# Patient Record
Sex: FEMALE | Race: BLACK OR AFRICAN AMERICAN | NOT HISPANIC OR LATINO | URBAN - METROPOLITAN AREA
[De-identification: names, ages, dates, MRNs, and addresses within clinical notes are randomized per-mention and may not be internally consistent; named-entity substitution may affect disease eponyms.]

---

## 2022-09-13 ENCOUNTER — OCCMED (OUTPATIENT)
Dept: URGENT CARE | Facility: CLINIC | Age: 42
End: 2022-09-13

## 2022-09-13 ENCOUNTER — APPOINTMENT (OUTPATIENT)
Dept: LAB | Facility: CLINIC | Age: 42
End: 2022-09-13

## 2022-09-13 DIAGNOSIS — Z02.1 PRE-EMPLOYMENT EXAMINATION: ICD-10-CM

## 2022-09-13 DIAGNOSIS — Z02.1 PRE-EMPLOYMENT EXAMINATION: Primary | ICD-10-CM

## 2022-09-13 LAB — RUBV IGG SERPL IA-ACNC: >175 IU/ML

## 2022-09-13 PROCEDURE — 86765 RUBEOLA ANTIBODY: CPT

## 2022-09-13 PROCEDURE — 36415 COLL VENOUS BLD VENIPUNCTURE: CPT

## 2022-09-13 PROCEDURE — 86762 RUBELLA ANTIBODY: CPT

## 2022-09-13 PROCEDURE — 86735 MUMPS ANTIBODY: CPT

## 2022-09-13 PROCEDURE — 86480 TB TEST CELL IMMUN MEASURE: CPT

## 2022-09-13 PROCEDURE — 86787 VARICELLA-ZOSTER ANTIBODY: CPT

## 2022-09-14 LAB
MEV IGG SER QL IA: NORMAL
MUV IGG SER QL IA: ABNORMAL
VZV IGG SER QL IA: NORMAL

## 2022-09-15 LAB
GAMMA INTERFERON BACKGROUND BLD IA-ACNC: 0.02 IU/ML
M TB IFN-G BLD-IMP: NEGATIVE
M TB IFN-G CD4+ BCKGRND COR BLD-ACNC: 0 IU/ML
M TB IFN-G CD4+ BCKGRND COR BLD-ACNC: 0 IU/ML
MITOGEN IGNF BCKGRD COR BLD-ACNC: >10 IU/ML

## 2023-09-29 ENCOUNTER — APPOINTMENT (EMERGENCY)
Dept: RADIOLOGY | Facility: HOSPITAL | Age: 43
End: 2023-09-29
Payer: OTHER MISCELLANEOUS

## 2023-09-29 ENCOUNTER — HOSPITAL ENCOUNTER (EMERGENCY)
Facility: HOSPITAL | Age: 43
Discharge: HOME | End: 2023-09-29
Attending: EMERGENCY MEDICINE
Payer: OTHER MISCELLANEOUS

## 2023-09-29 VITALS
SYSTOLIC BLOOD PRESSURE: 123 MMHG | HEART RATE: 85 BPM | TEMPERATURE: 98.7 F | DIASTOLIC BLOOD PRESSURE: 63 MMHG | RESPIRATION RATE: 18 BRPM | OXYGEN SATURATION: 98 %

## 2023-09-29 DIAGNOSIS — M79.601 RIGHT ARM PAIN: Primary | ICD-10-CM

## 2023-09-29 PROCEDURE — 99284 EMERGENCY DEPT VISIT MOD MDM: CPT | Performed by: EMERGENCY MEDICINE

## 2023-09-29 PROCEDURE — 73090 X-RAY EXAM OF FOREARM: CPT

## 2023-09-29 PROCEDURE — 99283 EMERGENCY DEPT VISIT LOW MDM: CPT

## 2023-09-29 RX ORDER — IBUPROFEN 600 MG/1
600 TABLET ORAL ONCE
Status: COMPLETED | OUTPATIENT
Start: 2023-09-29 | End: 2023-09-29

## 2023-09-29 RX ORDER — LIDOCAINE 50 MG/G
1 PATCH TOPICAL ONCE
Status: DISCONTINUED | OUTPATIENT
Start: 2023-09-29 | End: 2023-09-30 | Stop reason: HOSPADM

## 2023-09-29 RX ADMIN — LIDOCAINE 1 PATCH: 50 PATCH TOPICAL at 21:20

## 2023-09-29 RX ADMIN — IBUPROFEN 600 MG: 600 TABLET, FILM COATED ORAL at 21:20

## 2023-09-29 NOTE — Clinical Note
Kal Melendez was seen and treated in our emergency department on 9/29/2023. Diagnosis:     Armando Chowdhury  is off the rest of the shift today. She may return on this date: 10/01/2023         If you have any questions or concerns, please don't hesitate to call.       Wilda Vasquez MD    ______________________________           _______________          _______________  Hospital Representative                              Date                                Time

## 2023-09-29 NOTE — Clinical Note
Tania Hutton was seen and treated in our emergency department on 9/29/2023. Diagnosis:     Dmitriy Luis  is off the rest of the shift today. She may return on this date: 09/29/2023         If you have any questions or concerns, please don't hesitate to call.       Ronan Fraser MD    ______________________________           _______________          _______________  Hospital Representative                              Date                                Time

## 2023-09-29 NOTE — Clinical Note
Joel Griffin was seen and treated in our emergency department on 9/29/2023. Diagnosis:     Manav Ignacio  is off the rest of the shift today. She may return on this date: 09/29/2023         If you have any questions or concerns, please don't hesitate to call.       Froilan Ruggiero MD    ______________________________           _______________          _______________  Hospital Representative                              Date                                Time

## 2023-09-29 NOTE — Clinical Note
Myra Tanner was seen and treated in our emergency department on 9/29/2023. Diagnosis:     Isaac Bowles  is off the rest of the shift today. She may return on this date: 10/01/2023         If you have any questions or concerns, please don't hesitate to call.       Yash Puentes MD    ______________________________           _______________          _______________  Hospital Representative                              Date                                Time

## 2023-09-29 NOTE — Clinical Note
Sierra Carrillo was seen and treated in our emergency department on 9/29/2023. Diagnosis:     Shena Nicholas  is off the rest of the shift today. She may return on this date: 09/29/2023         If you have any questions or concerns, please don't hesitate to call.       nE Estes MD    ______________________________           _______________          _______________  Hospital Representative                              Date                                Time

## 2023-09-30 NOTE — DISCHARGE INSTRUCTIONS
Please follow-up with occupational health as soon as possible     You can take tylenol and/or motrin for pain as needed, follow the dosing instructions on the bottles. Apply ICE to the affected area as needed, follow the instructions provided. Return to the emergency department if you experience severe uncontrolled pain, the arm swells significantly, you lose sensation in your fingers, or any other concerning symptoms.
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
## 2023-10-03 ENCOUNTER — APPOINTMENT (OUTPATIENT)
Dept: URGENT CARE | Facility: CLINIC | Age: 43
End: 2023-10-03
Payer: OTHER MISCELLANEOUS

## 2023-10-03 PROCEDURE — 99213 OFFICE O/P EST LOW 20 MIN: CPT | Performed by: PHYSICIAN ASSISTANT
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
## 2023-10-10 ENCOUNTER — APPOINTMENT (OUTPATIENT)
Dept: URGENT CARE | Facility: CLINIC | Age: 43
End: 2023-10-10
Payer: OTHER MISCELLANEOUS

## 2023-10-10 PROCEDURE — 99213 OFFICE O/P EST LOW 20 MIN: CPT

## 2023-11-14 ENCOUNTER — APPOINTMENT (OUTPATIENT)
Dept: URGENT CARE | Facility: CLINIC | Age: 43
End: 2023-11-14
Payer: OTHER MISCELLANEOUS

## 2023-11-14 PROCEDURE — 99213 OFFICE O/P EST LOW 20 MIN: CPT

## 2023-11-27 ENCOUNTER — EVALUATION (OUTPATIENT)
Dept: PHYSICAL THERAPY | Facility: CLINIC | Age: 43
End: 2023-11-27
Payer: OTHER MISCELLANEOUS

## 2023-11-27 DIAGNOSIS — M25.511 ACUTE PAIN OF RIGHT SHOULDER: Primary | ICD-10-CM

## 2023-11-27 PROCEDURE — 97162 PT EVAL MOD COMPLEX 30 MIN: CPT

## 2023-11-27 PROCEDURE — 97110 THERAPEUTIC EXERCISES: CPT
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
## 2023-12-12 ENCOUNTER — HOSPITAL ENCOUNTER (OUTPATIENT)
Dept: MRI IMAGING | Facility: HOSPITAL | Age: 43
Discharge: HOME | End: 2023-12-12
Attending: FAMILY MEDICINE
Payer: OTHER MISCELLANEOUS

## 2023-12-12 ENCOUNTER — HOSPITAL ENCOUNTER (OUTPATIENT)
Dept: RADIOLOGY | Facility: HOSPITAL | Age: 43
Discharge: HOME | End: 2023-12-12
Attending: FAMILY MEDICINE
Payer: OTHER MISCELLANEOUS

## 2023-12-12 ENCOUNTER — APPOINTMENT (OUTPATIENT)
Dept: PHYSICAL THERAPY | Facility: CLINIC | Age: 43
End: 2023-12-12
Payer: OTHER MISCELLANEOUS

## 2023-12-12 DIAGNOSIS — M25.511 PAIN IN RIGHT SHOULDER: ICD-10-CM

## 2023-12-12 DIAGNOSIS — S50.11XA CONTUSION OF RIGHT FOREARM, INITIAL ENCOUNTER: ICD-10-CM

## 2023-12-12 PROCEDURE — G1004 CDSM NDSC: HCPCS

## 2023-12-12 PROCEDURE — 73222 MRI JOINT UPR EXTREM W/DYE: CPT

## 2023-12-12 PROCEDURE — 23350 INJECTION FOR SHOULDER X-RAY: CPT

## 2023-12-12 PROCEDURE — A9585 GADOBUTROL INJECTION: HCPCS | Performed by: FAMILY MEDICINE

## 2023-12-12 PROCEDURE — 77002 NEEDLE LOCALIZATION BY XRAY: CPT

## 2023-12-12 RX ORDER — GADOBUTROL 604.72 MG/ML
2 INJECTION INTRAVENOUS
Status: COMPLETED | OUTPATIENT
Start: 2023-12-12 | End: 2023-12-12

## 2023-12-12 RX ORDER — LIDOCAINE HYDROCHLORIDE 10 MG/ML
30 INJECTION, SOLUTION EPIDURAL; INFILTRATION; INTRACAUDAL; PERINEURAL
Status: COMPLETED | OUTPATIENT
Start: 2023-12-12 | End: 2023-12-12

## 2023-12-12 RX ADMIN — GADOBUTROL 2 ML: 604.72 INJECTION INTRAVENOUS at 11:52

## 2023-12-12 RX ADMIN — LIDOCAINE HYDROCHLORIDE 4 ML: 10 INJECTION, SOLUTION EPIDURAL; INFILTRATION; INTRACAUDAL; PERINEURAL at 11:53

## 2023-12-12 RX ADMIN — IOHEXOL 1 ML: 300 INJECTION, SOLUTION INTRAVENOUS at 11:52

## 2023-12-13 ENCOUNTER — APPOINTMENT (OUTPATIENT)
Dept: PHYSICAL THERAPY | Facility: CLINIC | Age: 43
End: 2023-12-13
Payer: OTHER MISCELLANEOUS

## 2023-12-13 ENCOUNTER — OFFICE VISIT (OUTPATIENT)
Dept: PHYSICAL THERAPY | Facility: CLINIC | Age: 43
End: 2023-12-13
Payer: OTHER MISCELLANEOUS

## 2023-12-13 DIAGNOSIS — M25.511 ACUTE PAIN OF RIGHT SHOULDER: Primary | ICD-10-CM

## 2023-12-13 PROCEDURE — 97535 SELF CARE MNGMENT TRAINING: CPT | Performed by: PHYSICAL THERAPIST

## 2023-12-13 PROCEDURE — 97140 MANUAL THERAPY 1/> REGIONS: CPT | Performed by: PHYSICAL THERAPIST

## 2023-12-13 NOTE — PROGRESS NOTES
Daily Note     Today's date: 2023  Patient name: Virginia Lucero  : 1980  MRN: [id number]  Referring provider: Gt Staley DO  Dx:   Encounter Diagnosis     ICD-10-CM    1. Acute pain of right shoulder  M25.511                      Subjective: Fabiola Vivas explains that her pain is about a 9/10 this evening, she rests at about a 6/10 on average during the day. 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 Her shoulder is a little more irritated from the MRI with contrast yesterday. She has growing concern that she has a tear in the shoulder and if hoping for some relief from PT. Objective: See treatment diary below      Assessment: Tolerated treatment well. Patient demonstrated fatigue post treatment and would benefit from continued PT. Heavy restrictions and tenderness noted throughout the right shoulder and forearm. Light soft tissue mobilizations and moist heat provided this session, IASTM not tolerated well from Pt with increased pain. Will follow up on MRI results and progress as able. Encouraged her to focus on activity modification, pain control and using moist heat at home. Plan: Continue per plan of care. Precautions: None    Access Code: [email or web address]  URL: [email or web address]  Date: 2023  Prepared by: Hoda Bloom    Exercises  - Seated Shoulder Flexion Towel Slide at Table Top  - 1 x daily - 7 x weekly - 1 sets - 10 reps - 5 s hold  - Seated Shoulder Scaption Slide at Table Top with Forearm in Neutral  - 1 x daily - 7 x weekly - 1 sets - 10 reps - 5 s hold  - Seated Gentle Upper Trapezius Stretch  - 1 x daily - 7 x weekly - 1 sets - 10 reps - 10 s hold  - Standing Backward Shoulder Rolls  - 1 x daily - 7 x weekly - 1 sets - 10 reps  - Standing shoulder flexion wall slides  - 1 x daily - 7 x weekly - 1 sets - 10 reps - 5 s hold  - Standing Wrist Flexion Stretch  - 1 x daily - 7 x weekly - 1 sets - 10 reps - 5 s hold    Manuals   PROM Right shoulder  RN           Shoulder mobs, inf/post Grade 2 - 3 right shoulder  RN           IASTM right forearm extensor muscles  RN                        Neuro Re-Ed             Ball roll on wall             T band scapular slides up wall             T band wall clock             Wall push ups             Scapular retraction                                       Ther Ex             Pulleys flex/scap             Supine cane shldr flex             Table slides flex/scap/ER             Wall slides flex/scaption             Serratus punch supine             T band rows/ext             T band ER/IR             Doorway stretch                                                                 Ther Activity             UBE                          Gait Training                                       Modalities             CP

## 2023-12-15 ENCOUNTER — OFFICE VISIT (OUTPATIENT)
Dept: PHYSICAL THERAPY | Facility: CLINIC | Age: 43
End: 2023-12-15
Payer: OTHER MISCELLANEOUS

## 2023-12-15 DIAGNOSIS — M25.511 ACUTE PAIN OF RIGHT SHOULDER: Primary | ICD-10-CM

## 2023-12-15 PROCEDURE — 97140 MANUAL THERAPY 1/> REGIONS: CPT | Performed by: PHYSICAL THERAPIST

## 2023-12-15 PROCEDURE — 97112 NEUROMUSCULAR REEDUCATION: CPT | Performed by: PHYSICAL THERAPIST

## 2023-12-15 PROCEDURE — 97110 THERAPEUTIC EXERCISES: CPT | Performed by: PHYSICAL THERAPIST

## 2023-12-15 NOTE — PROGRESS NOTES
Daily Note     Today's date: 12/15/2023  Patient name: Yelena Olivares  : 1980  MRN: [id number]  Referring provider: Krystin Francisco DO  Dx:   Encounter Diagnosis     ICD-10-CM    1. Acute pain of right shoulder  M25.511                      Subjective: Giuliana explains that she continues to be sore, she feels like to hands on treatment is helpful and is hopeful to get things on the right track. Objective: See treatment diary below      Assessment: Tolerated treatment well. Patient demonstrated fatigue post treatment  Educated pt on condition and symptoms as well as ways to mobilize soft tissues herself with a tennis ball given her relief. Also added in suboccipital release with reduction in upper trapezius tension and changes made in distal neural symptoms in right hand. Will continue to progress pain control and reduction in soft tissue restrictions nv. Plan: Continue per plan of care.       Precautions: None    Manuals  12/13 12/15          PROM Right shoulder  RN RN          Shoulder mobs, inf/post Grade 2 - 3 right shoulder             IASTM/STM right forearm extensor muscles  RN RN          SOR   RN          Neuro Re-Ed             Chatalog on wall             T band scapular slides up wall             T band wall clock             Wall push ups             Scapular retraction   10x5''          Chin tucks   10x5'' supine                       Ther Ex             Pulleys flex/scap             Supine cane shldr flex             Table slides flex/scap/ER             Wall slides flex/scaption             Serratus punch supine             T band rows/ext             T band ER/IR             Doorway stretch             UT s'   10x5''          Self ball massage w/ towel to rhomobids/UT   HEP          Seated rhomboid s'   10x5''                       Ther Activity             UBE                          Gait Training                                       Modalities             CP                8' pre

## 2023-12-19 ENCOUNTER — APPOINTMENT (OUTPATIENT)
Dept: URGENT CARE | Facility: CLINIC | Age: 43
End: 2023-12-19
Payer: OTHER MISCELLANEOUS

## 2023-12-19 PROCEDURE — 99213 OFFICE O/P EST LOW 20 MIN: CPT

## 2023-12-27 ENCOUNTER — OFFICE VISIT (OUTPATIENT)
Dept: PHYSICAL THERAPY | Facility: CLINIC | Age: 43
End: 2023-12-27
Payer: OTHER MISCELLANEOUS

## 2023-12-27 DIAGNOSIS — M25.511 ACUTE PAIN OF RIGHT SHOULDER: Primary | ICD-10-CM

## 2023-12-27 PROCEDURE — 97110 THERAPEUTIC EXERCISES: CPT | Performed by: PHYSICAL THERAPIST

## 2023-12-27 PROCEDURE — 97140 MANUAL THERAPY 1/> REGIONS: CPT | Performed by: PHYSICAL THERAPIST

## 2023-12-27 NOTE — PROGRESS NOTES
Daily Note     Today's date: 2023  Patient name: Yoselyn Xie  : 1980  MRN: [id number]  Referring provider: Hima Grove DO  Dx:   Encounter Diagnosis     ICD-10-CM    1. Acute pain of right shoulder  M25.511                      Subjective: Yoselyn explains that she woke up with a lot of pain in her upper trap and shoulder blade area on the right, she is really wanting to loosen it up with therapy today.       Objective: See treatment diary below      Assessment: Tolerated treatment well. Patient demonstrated fatigue post treatment and exhibited good technique with therapeutic exercises. Much improved pain levels and flexibility upon completion of session. She responded well to trigger point release focused at right rhomboids and upper trapezius. Also added in doorway rhomboid stretches, updated HEP to reflect changes made this session.    Plan: Continue per plan of care.      Precautions: None    Manuals  12/13 12/15 12/27         PROM Right shoulder  RN RN RN         Shoulder mobs, inf/post Grade 2 - 3 right shoulder             IASTM/STM right forearm extensor muscles  RN RN          TPR/STM R rhomboids/UT    RN seated         SOR   RN RN         Neuro Re-Ed             Ball roll on wall             T band scapular slides up wall             T band wall clock             Wall push ups             Scapular retraction   10x5'' 10x5''         Chin tucks   10x5'' supine                       Ther Ex             Pulleys flex/scap             Supine cane shldr flex             Table slides flex/scap/ER             Wall slides flex/scaption             Serratus punch supine             T band rows/ext             T band ER/IR             Doorway stretch             UT s'   10x5'' 10x5''         Self ball massage w/ towel to rhomobids/UT   HEP          Seated rhomboid s'   10x5'' 10x5''         Doorway rhomboid s'    10x5''         Ther Activity             UBE                          Gait  Training                                       Modalities             CP                8' pre 8' pre seated

## 2024-01-03 ENCOUNTER — APPOINTMENT (OUTPATIENT)
Dept: PHYSICAL THERAPY | Facility: CLINIC | Age: 44
End: 2024-01-03
Payer: OTHER MISCELLANEOUS

## 2024-01-04 ENCOUNTER — OFFICE VISIT (OUTPATIENT)
Dept: PHYSICAL THERAPY | Facility: CLINIC | Age: 44
End: 2024-01-04
Payer: OTHER MISCELLANEOUS

## 2024-01-04 DIAGNOSIS — M25.511 ACUTE PAIN OF RIGHT SHOULDER: Primary | ICD-10-CM

## 2024-01-04 PROCEDURE — 97140 MANUAL THERAPY 1/> REGIONS: CPT | Performed by: PHYSICAL THERAPIST

## 2024-01-04 NOTE — PROGRESS NOTES
Daily Note     Today's date: 2024  Patient name: Yoselyn Xie  : 1980  MRN: [id number]  Referring provider: Hima Grove DO  Dx:   Encounter Diagnosis     ICD-10-CM    1. Acute pain of right shoulder  M25.511                      Subjective: Giuliana explains that her shoulder and neck feel very tight today, she is disappointed.       Objective: See treatment diary below      Assessment: Tolerated treatment well. Patient demonstrated fatigue post treatment and exhibited good technique with therapeutic exercises. Reduced tolerance to manuals this session, emphasis placed on importance of shoulder position and keeping upper trapezius calm. Focused on manuals this session, encouraged her to try shoulder flexion stretches supported.       Plan: Continue per plan of care.      Precautions: None    Manuals  12/13 12/15 12/27 1/4        PROM Right shoulder  RN RN RN RN        Shoulder mobs, inf/post Grade 2 - 3 right shoulder             IASTM/STM right forearm extensor muscles  RN RN          TPR/STM R rhomboids/UT    RN seated RN seated        SOR   RN RN RN        Neuro Re-Ed             Ball roll on wall             T band scapular slides up wall             T band wall clock             Wall push ups             Scapular retraction   10x5'' 10x5''         Chin tucks   10x5'' supine                       Ther Ex             Pulleys flex/scap             Supine cane shldr flex             Table slides flex/scap/ER             Wall slides flex/scaption             Serratus punch supine             T band rows/ext             T band ER/IR             Doorway stretch             UT s'   10x5'' 10x5'' 10x5''        Self ball massage w/ towel to rhomobids/UT   HEP          Seated rhomboid s'   10x5'' 10x5'' nv        Ball roll outs     2x10x5''        Doorway rhomboid s'    10x5'' nv        Ther Activity             UBE                          Gait Training                                       Modalities              CP             MH   8' pre 8' pre seated

## 2024-01-08 ENCOUNTER — OFFICE VISIT (OUTPATIENT)
Dept: PHYSICAL THERAPY | Facility: CLINIC | Age: 44
End: 2024-01-08
Payer: OTHER MISCELLANEOUS

## 2024-01-08 DIAGNOSIS — M25.511 ACUTE PAIN OF RIGHT SHOULDER: Primary | ICD-10-CM

## 2024-01-08 PROCEDURE — 97140 MANUAL THERAPY 1/> REGIONS: CPT | Performed by: PHYSICAL THERAPIST

## 2024-01-08 PROCEDURE — 97112 NEUROMUSCULAR REEDUCATION: CPT | Performed by: PHYSICAL THERAPIST

## 2024-01-08 NOTE — PROGRESS NOTES
Daily Note     Today's date: 2024  Patient name: Yoselyn Xie  : 1980  MRN: [id number]  Referring provider: Hima Grove DO  Dx:   Encounter Diagnosis     ICD-10-CM    1. Acute pain of right shoulder  M25.511                      Subjective: Giuliana explains that she is excited for PT as she feels better afterwards.      Objective: See treatment diary below      Assessment: Tolerated treatment well. Patient demonstrated fatigue post treatment. Significant tenderness noted to pectoralis musculature, time spent completing soft tissue mobilizations. Continued radiating pain into right forearm and hand during manuals. Continue to progress chest wall expansion and manuals to promote full return to maximal level of function.       Plan: Continue per plan of care.      Precautions: None    Manuals  12/13 12/15 12/27 1/4 1/8       PROM Right shoulder  RN RN RN RN RN       Shoulder mobs, inf/post Grade 2 - 3 right shoulder             IASTM/STM right forearm extensor muscles  RN RN          TPR/STM R rhomboids/UT    RN seated RN seated RN       SOR   RN RN RN RN       STM Pec domenico/min      RN       Neuro Re-Ed             Ball roll on wall             T band scapular slides up wall             T band wall clock             Wall push ups             Scapular retraction   10x5'' 10x5''  10x5''       Chin tucks   10x5'' supine   10x5'' supine                    Ther Ex             Pulleys flex/scap             Supine cane shldr flex             Table slides flex/scap/ER             Wall slides flex/scaption             Serratus punch supine             T band rows/ext             T band ER/IR             Doorway stretch             UT s'   10x5'' 10x5'' 10x5'' 10x5''       Self ball massage w/ towel to rhomobids/UT   HEP          Seated rhomboid s'   10x5'' 10x5'' nv        Ball roll outs     2x10x5'' 2x10x5''       Seated thoracic ext s'      10x5''       Doorway rhomboid s'    10x5'' nv        Ther Activity              UBE                          Gait Training                                       Modalities             CP             MH   8' pre 8' pre seated

## 2024-01-10 ENCOUNTER — OFFICE VISIT (OUTPATIENT)
Dept: PHYSICAL THERAPY | Facility: CLINIC | Age: 44
End: 2024-01-10
Payer: OTHER MISCELLANEOUS

## 2024-01-10 DIAGNOSIS — M25.511 ACUTE PAIN OF RIGHT SHOULDER: Primary | ICD-10-CM

## 2024-01-10 PROCEDURE — 97112 NEUROMUSCULAR REEDUCATION: CPT | Performed by: PHYSICAL THERAPIST

## 2024-01-10 PROCEDURE — 97140 MANUAL THERAPY 1/> REGIONS: CPT | Performed by: PHYSICAL THERAPIST

## 2024-01-10 NOTE — PROGRESS NOTES
Daily Note     Today's date: 1/10/2024  Patient name: Yoselyn Xie  : 1980  MRN: [id number]  Referring provider: Hima Grove DO  Dx:   Encounter Diagnosis     ICD-10-CM    1. Acute pain of right shoulder  M25.511                      Subjective: Giuliana explains that today her back shoulder area is sore today, she notes that her forearm is not so sore today.       Objective: See treatment diary below      Assessment: Tolerated treatment well. Patient demonstrated fatigue post treatment and exhibited good technique with therapeutic exercises. Giuliana explains that her right shoulder pain greatly subsides after manuals and trigger point release, particularly to upper trapezius and levator scapulae today. Education provided about self massage with S hook today for consistency at home.       Plan: Continue per plan of care.      Precautions: None    Manuals  12/13 12/15 12/27 1/4 1/8 1/10      PROM Right shoulder  RN RN RN RN RN RN      Shoulder mobs, inf/post Grade 2 - 3 right shoulder             IASTM/STM right forearm extensor muscles  RN RN          TPR/STM R rhomboids/UT    RN seated RN seated RN RN      SOR   RN RN RN RN RN      STM Pec domenico/min      RN RN      Neuro Re-Ed             Ball roll on wall             T band scapular slides up wall             T band wall clock             Wall push ups             Scapular retraction   10x5'' 10x5''  10x5'' 10x5''      Chin tucks   10x5'' supine   10x5'' supine 10x5'' supine                   Ther Ex             Pulleys flex/scap             Supine cane shldr flex             Table slides flex/scap/ER             Wall slides flex/scaption             Serratus punch supine             T band rows/ext             T band ER/IR             Doorway stretch             UT s'   10x5'' 10x5'' 10x5'' 10x5''       Self ball massage w/ towel to rhomobids/UT   HEP          Seated rhomboid s'   10x5'' 10x5'' nv        Ball roll outs     2x10x5'' 2x10x5''       Seated  thoracic ext s'      2x10x5''       Doorway pec s'      2x10x5''       Doorway rhomboid s'    10x5'' nv 2x10x5''       Ther Activity             UBE                          Gait Training                                       Modalities             CP             MH   8' pre 8' pre seated  8' pre seated

## 2024-01-15 ENCOUNTER — OFFICE VISIT (OUTPATIENT)
Dept: PHYSICAL THERAPY | Facility: CLINIC | Age: 44
End: 2024-01-15
Payer: OTHER MISCELLANEOUS

## 2024-01-15 DIAGNOSIS — M25.511 ACUTE PAIN OF RIGHT SHOULDER: Primary | ICD-10-CM

## 2024-01-15 PROCEDURE — 97140 MANUAL THERAPY 1/> REGIONS: CPT | Performed by: PHYSICAL THERAPIST

## 2024-01-15 PROCEDURE — 97112 NEUROMUSCULAR REEDUCATION: CPT | Performed by: PHYSICAL THERAPIST

## 2024-01-15 NOTE — PROGRESS NOTES
Daily Note     Today's date: 1/15/2024  Patient name: Yoselyn Xie  : 1980  MRN: [id number]  Referring provider: Hima Grove DO  Dx:   Encounter Diagnosis     ICD-10-CM    1. Acute pain of right shoulder  M25.511                      Subjective: Yoselyn explains that her shoulder feels tight, she is overall feeling okay today.       Objective: See treatment diary below      Assessment: Tolerated treatment well. Patient demonstrated fatigue post treatment and exhibited good technique with therapeutic exercises. Giuliana explains that her shoulder felt better than normal with the manuals. Added in shoulder flexion stretch on wall with cueing to avoid pain and controlling upper trapezius compensations. Updated HEP to reflect changes made this session.      Plan: Continue per plan of care.      Precautions: None    Manuals  12/13 12/15 12/27 1/4 1/8 1/10 1/15     PROM Right shoulder  RN RN RN RN RN RN RN     Shoulder mobs, inf/post Grade 2 - 3 right shoulder             IASTM/STM right forearm extensor muscles  RN RN          TPR/STM R rhomboids/UT    RN seated RN seated RN RN RN     SOR   RN RN RN RN RN RN     STM Pec domenico/min      RN RN RN     Neuro Re-Ed             Ball roll on wall             T band scapular slides up wall             T band wall clock             Wall push ups             Scapular retraction   10x5'' 10x5''  10x5'' 10x5'' 10x5''     Chin tucks   10x5'' supine   10x5'' supine 10x5'' supine 10x5'' supine     Shoulder wall slides flexion        x10     Ther Ex             Pulleys flex/scap             Supine cane shldr flex             Table slides flex/scap/ER             Wall slides flex/scaption             Serratus punch supine             T band rows/ext             T band ER/IR             Doorway stretch             UT s'   10x5'' 10x5'' 10x5'' 10x5''       Self ball massage w/ towel to rhomobids/UT   HEP          Seated rhomboid s'   10x5'' 10x5'' nv        Ball roll outs      2x10x5'' 2x10x5''  2x10x5''     Seated thoracic ext s'      2x10x5''       Doorway pec s'      2x10x5''  2x10x0''     Doorway rhomboid s'    10x5'' nv 2x10x5''  2x10x5''     Ther Activity             UBE                          Gait Training                                       Modalities             CP                8' pre 8' pre seated  8' pre seated  8' seated pre

## 2024-01-16 ENCOUNTER — APPOINTMENT (OUTPATIENT)
Dept: URGENT CARE | Facility: CLINIC | Age: 44
End: 2024-01-16
Payer: OTHER MISCELLANEOUS

## 2024-01-16 PROCEDURE — 99213 OFFICE O/P EST LOW 20 MIN: CPT

## 2024-01-18 ENCOUNTER — APPOINTMENT (OUTPATIENT)
Dept: PHYSICAL THERAPY | Facility: CLINIC | Age: 44
End: 2024-01-18
Payer: OTHER MISCELLANEOUS

## 2024-01-22 ENCOUNTER — APPOINTMENT (OUTPATIENT)
Dept: PHYSICAL THERAPY | Facility: CLINIC | Age: 44
End: 2024-01-22
Payer: OTHER MISCELLANEOUS

## 2024-01-25 ENCOUNTER — OFFICE VISIT (OUTPATIENT)
Dept: PHYSICAL THERAPY | Facility: CLINIC | Age: 44
End: 2024-01-25
Payer: OTHER MISCELLANEOUS

## 2024-01-25 DIAGNOSIS — M25.511 ACUTE PAIN OF RIGHT SHOULDER: Primary | ICD-10-CM

## 2024-01-25 PROCEDURE — 97140 MANUAL THERAPY 1/> REGIONS: CPT | Performed by: PHYSICAL THERAPIST

## 2024-01-25 PROCEDURE — 97112 NEUROMUSCULAR REEDUCATION: CPT | Performed by: PHYSICAL THERAPIST

## 2024-01-25 NOTE — PROGRESS NOTES
Daily Note     Today's date: 2024  Patient name: Yoselyn Xie  : 1980  MRN: [id number]  Referring provider: Hima Grove DO  Dx:   Encounter Diagnosis     ICD-10-CM    1. Acute pain of right shoulder  M25.511                      Subjective: Giuliana explains that she has been going through a lot of family stress, she has felt an increase in right shoulder pain because of this.       Objective: See treatment diary below      Assessment: Tolerated treatment well. Patient demonstrated fatigue post treatment and exhibited good technique with therapeutic exercises. Giuliana continues to have restrictions throughout upper trapezius, levator, rhomboids and pectorals. She responded well to all soft tissue mobilizations but continues to have tenderness throughout. Continue to progress mobility as tolerated nv.       Plan: Continue per plan of care.      Precautions: None    Manuals  12/13 12/15 12/27 1/4 1/8 1/10 1/15 1/25    PROM Right shoulder  RN RN RN RN RN RN RN RN    Shoulder mobs, inf/post Grade 2 - 3 right shoulder             IASTM/STM right forearm extensor muscles  RN RN          TPR/STM R rhomboids/UT    RN seated RN seated RN RN RN RN    SOR   RN RN RN RN RN RN RN    STM Pec domenico/min      RN RN RN RN    Neuro Re-Ed             Ball roll on wall             T band scapular slides up wall             T band wall clock             Wall push ups             Scapular retraction   10x5'' 10x5''  10x5'' 10x5'' 10x5''     Chin tucks   10x5'' supine   10x5'' supine 10x5'' supine 10x5'' supine 10x5'' supine    Shoulder wall slides flexion        x10     Ther Ex             Pulleys flex/scap             Supine cane shldr flex             Table slides flex/scap/ER             Wall slides flex/scaption             Serratus punch supine             T band rows/ext             T band ER/IR             Doorway stretch             UT s'   10x5'' 10x5'' 10x5'' 10x5''       Self ball massage w/ towel to rhomobids/UT    HEP          Seated rhomboid s'   10x5'' 10x5'' nv        Ball roll outs     2x10x5'' 2x10x5''  2x10x5'' 2x10x5''    Seated thoracic ext s'      2x10x5''       Doorway pec s'      2x10x5''  2x10x0'' 2x10x5''    Doorway rhomboid s'    10x5'' nv 2x10x5''  2x10x5'' 2x10x5''    Ther Activity             UBE                          Gait Training                                       Modalities             CP                8' pre 8' pre seated  8' pre seated  8' seated pre 8' seated pre

## 2024-01-29 ENCOUNTER — APPOINTMENT (OUTPATIENT)
Dept: PHYSICAL THERAPY | Facility: CLINIC | Age: 44
End: 2024-01-29
Payer: OTHER MISCELLANEOUS

## 2024-01-31 ENCOUNTER — OFFICE VISIT (OUTPATIENT)
Dept: PHYSICAL THERAPY | Facility: CLINIC | Age: 44
End: 2024-01-31
Payer: OTHER MISCELLANEOUS

## 2024-01-31 DIAGNOSIS — M25.511 ACUTE PAIN OF RIGHT SHOULDER: Primary | ICD-10-CM

## 2024-01-31 PROCEDURE — 97140 MANUAL THERAPY 1/> REGIONS: CPT

## 2024-01-31 PROCEDURE — 97112 NEUROMUSCULAR REEDUCATION: CPT

## 2024-01-31 NOTE — PROGRESS NOTES
"Daily Note     Today's date: 2024  Patient name: Yoselyn Xie  : 1980  MRN: [id number]  Referring provider: Hima Grove DO  Dx:   Encounter Diagnosis     ICD-10-CM    1. Acute pain of right shoulder  M25.511           Start Time: 1700  Stop Time: 1742  Total time in clinic (min): 42 minutes    Subjective: Patient states that R shoulder in moderately tight at start of session.      Objective: See treatment diary below      Assessment: Tolerated treatment well. Mod TTP in mid trap/ upper trap and rhomboids. Vcs and visual cues utilized to prevent deep neck flexion with chin tucks in supine. Cues to avoid upper trap compensation with scap retractions and chin tucks also issued. Patient demonstrated fatigue post treatment and would benefit from continued PT to improve shoulder ROM.      Plan: Continue per plan of care.      Precautions: None    Manuals  12/13 12/15 12/27 1/4 1/8 1/10 1/15 1/25 1/31   PROM Right shoulder  RN RN RN RN RN RN RN RN LQ   Shoulder mobs, inf/post Grade 2 - 3 right shoulder             IASTM/STM right forearm extensor muscles  RN RN          TPR/STM R rhomboids/UT    RN seated RN seated RN RN RN RN LQ   SOR   RN RN RN RN RN RN RN LQ   STM Pec domenico/min      RN RN RN RN LQ   Neuro Re-Ed             Ball roll on wall             T band scapular slides up wall             T band wall clock             Wall push ups             Scapular retraction   10x5'' 10x5''  10x5'' 10x5'' 10x5''  10x5\"   Chin tucks   10x5'' supine   10x5'' supine 10x5'' supine 10x5'' supine 10x5'' supine 15x5'' supine   Shoulder wall slides flexion        x10     Ther Ex             Pulleys flex/scap             Supine cane shldr flex             Table slides flex/scap/ER             Wall slides flex/scaption             Serratus punch supine             T band rows/ext             T band ER/IR             Doorway stretch             UT s'   10x5'' 10x5'' 10x5'' 10x5''       Self ball massage w/ towel to " rhomobids/UT   HEP          Seated rhomboid s'   10x5'' 10x5'' nv        Ball roll outs     2x10x5'' 2x10x5''  2x10x5'' 2x10x5'' 2x10x5''   Seated thoracic ext s'      2x10x5''       Doorway pec s'      2x10x5''  2x10x0'' 2x10x5'' 2x10x5''   Doorway rhomboid s'    10x5'' nv 2x10x5''  2x10x5'' 2x10x5'' 2x10x5''   Ther Activity             UBE                          Gait Training                                       Modalities             CP             MH   8' pre 8' pre seated  8' pre seated  8' seated pre 8' seated pre 8' seated pre

## 2024-02-05 ENCOUNTER — OFFICE VISIT (OUTPATIENT)
Dept: PHYSICAL THERAPY | Facility: CLINIC | Age: 44
End: 2024-02-05
Payer: OTHER MISCELLANEOUS

## 2024-02-05 DIAGNOSIS — M25.511 ACUTE PAIN OF RIGHT SHOULDER: Primary | ICD-10-CM

## 2024-02-05 PROCEDURE — 97140 MANUAL THERAPY 1/> REGIONS: CPT

## 2024-02-05 PROCEDURE — 97112 NEUROMUSCULAR REEDUCATION: CPT

## 2024-02-05 NOTE — PROGRESS NOTES
"Daily Note     Today's date: 2024  Patient name: Yoselyn Xie  : 1980  MRN: [id number]  Referring provider: Hima Grove DO  Dx:   Encounter Diagnosis     ICD-10-CM    1. Acute pain of right shoulder  M25.511                      Subjective: pt states that she is sore but that she is feeling better.  She feels like the manuals to scapula helped last visit.       Objective: See treatment diary below      Assessment: Pt with most tightness in UT and levator and forearm.  More TTP over MT and rhomboid of right shoulder.  .VC for head position during pec stretch in doorway. Pt is performing tpr at home with tennis ball.        Plan: Continue per plan of care.      Precautions: None    Manuals 2/5     1/8 1/10 1/15 1/25 1/31   PROM Right shoulder DL     RN RN RN RN LQ   Shoulder mobs, inf/post Grade 2 - 3 right shoulder             IASTM/STM right forearm extensor muscles Stm forearm-DL            TPR/STM R rhomboids/UT DL     RN RN RN RN LQ   SOR DL     RN RN RN RN LQ   STM Pec domenico/min DL     RN RN RN RN LQ   Neuro Re-Ed             Ball roll on wall             T band scapular slides up wall             T band wall clock             Wall push ups             Scapular retraction      10x5'' 10x5'' 10x5''  10x5\"   Chin tucks      10x5'' supine 10x5'' supine 10x5'' supine 10x5'' supine 15x5'' supine   Shoulder wall slides flexion        x10     Ther Ex             Pulleys flex/scap             Supine cane shldr flex             Table slides flex/scap/ER             Wall slides flex/scaption             Serratus punch supine             T band rows/ext             T band ER/IR             Doorway stretch             UT s'      10x5''       Self ball massage w/ towel to rhomobids/UT             Seated rhomboid s'             Ball roll outs 5\" 2x10     2x10x5''  2x10x5'' 2x10x5'' 2x10x5''   Seated thoracic ext s'      2x10x5''       Doorway pec s' 5\" x20     2x10x5''  2x10x0'' 2x10x5'' 2x10x5'' " "  Doorway rhomboid s' 5\"x20     2x10x5''  2x10x5'' 2x10x5'' 2x10x5''   Ther Activity             UBE                          Gait Training                                       Modalities             CP             MH Pre supine      8' pre seated  8' seated pre 8' seated pre 8' seated pre                                  "

## 2024-02-06 ENCOUNTER — APPOINTMENT (EMERGENCY)
Dept: ULTRASOUND IMAGING | Facility: HOSPITAL | Age: 44
End: 2024-02-06
Payer: COMMERCIAL

## 2024-02-06 ENCOUNTER — APPOINTMENT (EMERGENCY)
Dept: CT IMAGING | Facility: HOSPITAL | Age: 44
End: 2024-02-06
Payer: COMMERCIAL

## 2024-02-06 ENCOUNTER — HOSPITAL ENCOUNTER (EMERGENCY)
Facility: HOSPITAL | Age: 44
Discharge: HOME | End: 2024-02-06
Attending: EMERGENCY MEDICINE | Admitting: EMERGENCY MEDICINE
Payer: COMMERCIAL

## 2024-02-06 VITALS
DIASTOLIC BLOOD PRESSURE: 59 MMHG | SYSTOLIC BLOOD PRESSURE: 121 MMHG | HEIGHT: 65 IN | BODY MASS INDEX: 25.83 KG/M2 | OXYGEN SATURATION: 100 % | RESPIRATION RATE: 16 BRPM | WEIGHT: 155 LBS | TEMPERATURE: 99.4 F | HEART RATE: 67 BPM

## 2024-02-06 DIAGNOSIS — N83.209 OVARIAN CYST: ICD-10-CM

## 2024-02-06 DIAGNOSIS — R11.2 NAUSEA AND VOMITING: ICD-10-CM

## 2024-02-06 DIAGNOSIS — R10.9 ABDOMINAL PAIN: Primary | ICD-10-CM

## 2024-02-06 LAB
ALBUMIN SERPL BCP-MCNC: 4.8 G/DL (ref 3.5–5)
ALP SERPL-CCNC: 66 U/L (ref 34–104)
ALT SERPL W P-5'-P-CCNC: 11 U/L (ref 7–52)
ANION GAP SERPL CALCULATED.3IONS-SCNC: 5 MMOL/L
AST SERPL W P-5'-P-CCNC: 12 U/L (ref 13–39)
BASOPHILS # BLD AUTO: 0.02 THOUSANDS/ÂΜL (ref 0–0.1)
BASOPHILS NFR BLD AUTO: 0 % (ref 0–1)
BILIRUB SERPL-MCNC: 0.31 MG/DL (ref 0.2–1)
BILIRUB UR QL STRIP: NEGATIVE
BUN SERPL-MCNC: 13 MG/DL (ref 5–25)
CALCIUM SERPL-MCNC: 9.8 MG/DL (ref 8.4–10.2)
CHLORIDE SERPL-SCNC: 105 MMOL/L (ref 96–108)
CLARITY UR: CLEAR
CO2 SERPL-SCNC: 29 MMOL/L (ref 21–32)
COLOR UR: ABNORMAL
CREAT SERPL-MCNC: 0.87 MG/DL (ref 0.6–1.3)
EOSINOPHIL # BLD AUTO: 0.04 THOUSAND/ÂΜL (ref 0–0.61)
EOSINOPHIL NFR BLD AUTO: 1 % (ref 0–6)
ERYTHROCYTE [DISTWIDTH] IN BLOOD BY AUTOMATED COUNT: 13.2 % (ref 11.6–15.1)
EXT PREGNANCY TEST URINE: NEGATIVE
EXT. CONTROL: NORMAL
GFR SERPL CREATININE-BSD FRML MDRD: 80 ML/MIN/1.73SQ M
GLUCOSE SERPL-MCNC: 101 MG/DL (ref 65–140)
GLUCOSE UR STRIP-MCNC: NEGATIVE MG/DL
HCT VFR BLD AUTO: 40.6 % (ref 34.8–46.1)
HGB BLD-MCNC: 12.9 G/DL (ref 11.5–15.4)
HGB UR QL STRIP.AUTO: NEGATIVE
IMM GRANULOCYTES # BLD AUTO: 0.04 THOUSAND/UL (ref 0–0.2)
IMM GRANULOCYTES NFR BLD AUTO: 1 % (ref 0–2)
KETONES UR STRIP-MCNC: ABNORMAL MG/DL
LEUKOCYTE ESTERASE UR QL STRIP: NEGATIVE
LIPASE SERPL-CCNC: 25 U/L (ref 11–82)
LYMPHOCYTES # BLD AUTO: 1.59 THOUSANDS/ÂΜL (ref 0.6–4.47)
LYMPHOCYTES NFR BLD AUTO: 25 % (ref 14–44)
MCH RBC QN AUTO: 28.6 PG (ref 26.8–34.3)
MCHC RBC AUTO-ENTMCNC: 31.8 G/DL (ref 31.4–37.4)
MCV RBC AUTO: 90 FL (ref 82–98)
MONOCYTES # BLD AUTO: 0.36 THOUSAND/ÂΜL (ref 0.17–1.22)
MONOCYTES NFR BLD AUTO: 6 % (ref 4–12)
NEUTROPHILS # BLD AUTO: 4.34 THOUSANDS/ÂΜL (ref 1.85–7.62)
NEUTS SEG NFR BLD AUTO: 67 % (ref 43–75)
NITRITE UR QL STRIP: NEGATIVE
NRBC BLD AUTO-RTO: 0 /100 WBCS
PH UR STRIP.AUTO: 7 [PH]
PLATELET # BLD AUTO: 240 THOUSANDS/UL (ref 149–390)
PMV BLD AUTO: 8.9 FL (ref 8.9–12.7)
POTASSIUM SERPL-SCNC: 4 MMOL/L (ref 3.5–5.3)
PROT SERPL-MCNC: 7.8 G/DL (ref 6.4–8.4)
PROT UR STRIP-MCNC: NEGATIVE MG/DL
RBC # BLD AUTO: 4.51 MILLION/UL (ref 3.81–5.12)
SODIUM SERPL-SCNC: 139 MMOL/L (ref 135–147)
SP GR UR STRIP.AUTO: 1.02 (ref 1–1.03)
UROBILINOGEN UR STRIP-ACNC: <2 MG/DL
WBC # BLD AUTO: 6.39 THOUSAND/UL (ref 4.31–10.16)

## 2024-02-06 PROCEDURE — 36415 COLL VENOUS BLD VENIPUNCTURE: CPT

## 2024-02-06 PROCEDURE — 80053 COMPREHEN METABOLIC PANEL: CPT | Performed by: EMERGENCY MEDICINE

## 2024-02-06 PROCEDURE — 99283 EMERGENCY DEPT VISIT LOW MDM: CPT

## 2024-02-06 PROCEDURE — 74176 CT ABD & PELVIS W/O CONTRAST: CPT

## 2024-02-06 PROCEDURE — 96376 TX/PRO/DX INJ SAME DRUG ADON: CPT

## 2024-02-06 PROCEDURE — 83690 ASSAY OF LIPASE: CPT

## 2024-02-06 PROCEDURE — 81003 URINALYSIS AUTO W/O SCOPE: CPT

## 2024-02-06 PROCEDURE — 81025 URINE PREGNANCY TEST: CPT

## 2024-02-06 PROCEDURE — 76830 TRANSVAGINAL US NON-OB: CPT

## 2024-02-06 PROCEDURE — 96375 TX/PRO/DX INJ NEW DRUG ADDON: CPT

## 2024-02-06 PROCEDURE — 85025 COMPLETE CBC W/AUTO DIFF WBC: CPT | Performed by: EMERGENCY MEDICINE

## 2024-02-06 PROCEDURE — 96374 THER/PROPH/DIAG INJ IV PUSH: CPT

## 2024-02-06 PROCEDURE — G1004 CDSM NDSC: HCPCS

## 2024-02-06 PROCEDURE — 76856 US EXAM PELVIC COMPLETE: CPT

## 2024-02-06 PROCEDURE — 99284 EMERGENCY DEPT VISIT MOD MDM: CPT | Performed by: EMERGENCY MEDICINE

## 2024-02-06 RX ORDER — ONDANSETRON 2 MG/ML
4 INJECTION INTRAMUSCULAR; INTRAVENOUS ONCE
Status: COMPLETED | OUTPATIENT
Start: 2024-02-06 | End: 2024-02-06

## 2024-02-06 RX ORDER — KETOROLAC TROMETHAMINE 30 MG/ML
15 INJECTION, SOLUTION INTRAMUSCULAR; INTRAVENOUS ONCE
Status: COMPLETED | OUTPATIENT
Start: 2024-02-06 | End: 2024-02-06

## 2024-02-06 RX ORDER — ONDANSETRON 4 MG/1
4 TABLET, ORALLY DISINTEGRATING ORAL EVERY 6 HOURS PRN
Qty: 12 TABLET | Refills: 0 | Status: SHIPPED | OUTPATIENT
Start: 2024-02-06

## 2024-02-06 RX ADMIN — KETOROLAC TROMETHAMINE 15 MG: 30 INJECTION, SOLUTION INTRAMUSCULAR; INTRAVENOUS at 15:13

## 2024-02-06 RX ADMIN — ONDANSETRON 4 MG: 2 INJECTION INTRAMUSCULAR; INTRAVENOUS at 19:56

## 2024-02-06 RX ADMIN — ONDANSETRON 4 MG: 2 INJECTION INTRAMUSCULAR; INTRAVENOUS at 15:12

## 2024-02-06 NOTE — ED PROVIDER NOTES
History  Chief Complaint   Patient presents with    Nausea     Pt presents to ED with nausea and pain along right flank; saw PCP yesterday and was dx with a UTI. Just started her abx today.     This is a 45 y/o female with no pertinent PMH who presents to the ER for right flank pain, urinary symptoms, nausea, vomiting. Patient reports she had a telemed appt with her PCP yesterday and was having the urinary symptoms of burning/discomfort with urination and increased urinary frequency. They started her on macrobid and she said she took one dose this morning. She states she developed the flank pain and nausea throughout the day. She denies history of kidney stones or kidney infections in the past. She is still having the urinary symptoms. She denies any changes in fevers, chills, blood in urine, changes in bowel movements, chest pain, shortness of breath. She denies taking any other medications for this.       History provided by:  Patient   used: No        None       History reviewed. No pertinent past medical history.    Past Surgical History:   Procedure Laterality Date    FL INJECTION RIGHT SHOULDER (ARTHROGRAM)  12/12/2023    PARTIAL HYSTERECTOMY  05/15/2022    US GUIDED THYROID BIOPSY  10/03/2022       History reviewed. No pertinent family history.  I have reviewed and agree with the history as documented.    E-Cigarette/Vaping    E-Cigarette Use Never User      E-Cigarette/Vaping Substances    Nicotine No      Social History     Tobacco Use    Smoking status: Never    Smokeless tobacco: Never   Vaping Use    Vaping status: Never Used   Substance Use Topics    Alcohol use: Never    Drug use: Never       Review of Systems   Constitutional:  Negative for chills and fever.   Respiratory:  Negative for shortness of breath.    Cardiovascular:  Negative for chest pain.   Gastrointestinal:  Positive for nausea and vomiting. Negative for abdominal pain.   Genitourinary:  Positive for dysuria, flank  pain and frequency. Negative for hematuria.   Skin:  Negative for color change.   Neurological:  Negative for headaches.   Psychiatric/Behavioral:  Negative for behavioral problems and sleep disturbance.    All other systems reviewed and are negative.      Physical Exam  Physical Exam  Vitals and nursing note reviewed.   Constitutional:       General: She is awake.      Appearance: Normal appearance. She is well-developed.   HENT:      Head: Normocephalic and atraumatic.      Right Ear: External ear normal.      Left Ear: External ear normal.      Nose: Nose normal.   Eyes:      General: No scleral icterus.     Extraocular Movements: Extraocular movements intact.   Cardiovascular:      Rate and Rhythm: Normal rate and regular rhythm.      Heart sounds: Normal heart sounds, S1 normal and S2 normal. No murmur heard.     No gallop.   Pulmonary:      Effort: Pulmonary effort is normal.      Breath sounds: Normal breath sounds. No wheezing, rhonchi or rales.   Abdominal:      General: Abdomen is flat. Bowel sounds are normal.      Palpations: Abdomen is soft.      Tenderness: There is abdominal tenderness. There is right CVA tenderness. There is no guarding or rebound.   Musculoskeletal:         General: Normal range of motion.      Cervical back: Normal range of motion.   Skin:     General: Skin is warm and dry.   Neurological:      General: No focal deficit present.      Mental Status: She is alert.   Psychiatric:         Attention and Perception: Attention and perception normal.         Mood and Affect: Mood normal.         Behavior: Behavior normal. Behavior is cooperative.         Vital Signs  ED Triage Vitals   Temperature Pulse Respirations Blood Pressure SpO2   02/06/24 1342 02/06/24 1343 02/06/24 1343 02/06/24 1343 02/06/24 1343   99.4 °F (37.4 °C) 78 16 150/88 98 %      Temp Source Heart Rate Source Patient Position - Orthostatic VS BP Location FiO2 (%)   02/06/24 1342 02/06/24 1343 02/06/24 1343 02/06/24 1343  --   Oral Monitor Sitting Left arm       Pain Score       02/06/24 1343       8           Vitals:    02/06/24 1730 02/06/24 1745 02/06/24 1800 02/06/24 1930   BP: 129/63   121/59   Pulse: 65 62 66 67   Patient Position - Orthostatic VS:             Visual Acuity      ED Medications  Medications   ondansetron (ZOFRAN) injection 4 mg (4 mg Intravenous Given 2/6/24 1512)   ketorolac (TORADOL) injection 15 mg (15 mg Intravenous Given 2/6/24 1513)   ondansetron (ZOFRAN) injection 4 mg (4 mg Intravenous Given 2/6/24 1956)       Diagnostic Studies  Results Reviewed       Procedure Component Value Units Date/Time    Lipase [[id number]]  (Normal) Collected: 02/06/24 1356    Lab Status: Final result Specimen: Blood from Arm, Left Updated: 02/06/24 1601     Lipase 25 u/L     UA w Reflex to Microscopic w Reflex to Culture [[id number]]  (Abnormal) Collected: 02/06/24 1517    Lab Status: Final result Specimen: Urine, Clean Catch Updated: 02/06/24 1533     Color, UA Dark Yellow     Clarity, UA Clear     Specific Gravity, UA 1.020     pH, UA 7.0     Leukocytes, UA Negative     Nitrite, UA Negative     Protein, UA Negative mg/dl      Glucose, UA Negative mg/dl      Ketones, UA 40 (2+) mg/dl      Urobilinogen, UA <2.0 mg/dl      Bilirubin, UA Negative     Occult Blood, UA Negative    POCT pregnancy, urine [[id number]]  (Normal) Resulted: 02/06/24 1515    Lab Status: Final result Updated: 02/06/24 1515     EXT Preg Test, Ur Negative     Control Valid    Comprehensive metabolic panel [[id number]]  (Abnormal) Collected: 02/06/24 1356    Lab Status: Final result Specimen: Blood from Arm, Left Updated: 02/06/24 1432     Sodium 139 mmol/L      Potassium 4.0 mmol/L      Chloride 105 mmol/L      CO2 29 mmol/L      ANION GAP 5 mmol/L      BUN 13 mg/dL      Creatinine 0.87 mg/dL      Glucose 101 mg/dL      Calcium 9.8 mg/dL      AST 12 U/L      ALT 11 U/L      Alkaline Phosphatase 66 U/L      Total Protein 7.8 g/dL      Albumin 4.8 g/dL      Total  Bilirubin 0.31 mg/dL      eGFR 80 ml/min/1.73sq m     Narrative:      [employer] Kid[employer] guidelines for Chronic Kidney Disease (CKD):     Stage 1 with normal or high GFR (GFR > 90 mL/min/1.73 square meters)    Stage 2 Mild CKD (GFR = 60-89 mL/min/1.73 square meters)    Stage 3A Moderate CKD (GFR = 45-59 mL/min/1.73 square meters)    Stage 3B Moderate CKD (GFR = 30-44 mL/min/1.73 square meters)    Stage 4 Severe CKD (GFR = 15-29 mL/min/1.73 square meters)    Stage 5 End Stage CKD (GFR <15 mL/min/1.73 square meters)  Note: GFR calculation is accurate only with a steady state creatinine    CBC and differential [[id number]] Collected: 02/06/24 1356    Lab Status: Final result Specimen: Blood from Arm, Left Updated: 02/06/24 1401     WBC 6.39 Thousand/uL      RBC 4.51 Million/uL      Hemoglobin 12.9 g/dL      Hematocrit 40.6 %      MCV 90 fL      MCH 28.6 pg      MCHC 31.8 g/dL      RDW 13.2 %      MPV 8.9 fL      Platelets 240 Thousands/uL      nRBC 0 /100 WBCs      Neutrophils Relative 67 %      Immat GRANS % 1 %      Lymphocytes Relative 25 %      Monocytes Relative 6 %      Eosinophils Relative 1 %      Basophils Relative 0 %      Neutrophils Absolute 4.34 Thousands/µL      Immature Grans Absolute 0.04 Thousand/uL      Lymphocytes Absolute 1.59 Thousands/µL      Monocytes Absolute 0.36 Thousand/µL      Eosinophils Absolute 0.04 Thousand/µL      Basophils Absolute 0.02 Thousands/µL                    US pelvis complete w transvaginal   Final Result by Wolf Godwin MD (02/06 1930)      2 adjacent cysts likely represent a hemorrhagic as well as corpus luteal cyst. Small amount of pelvic free fluid is noted. Flow is demonstrated to the ovaries bilaterally.                              Workstation performed: [id number]         CT renal stone study abdomen pelvis without contrast   Final Result by Randall Gutiérrez MD (02/06 1642)      By report the patient has had a prior partial hysterectomy. There is a 3.4  cm cystic rounded structure in the right anterior pelvis suspected to represent an ovary. There is adjacent fluid as well as dependent fluid in the pelvis. This can be better    evaluated by pelvic ultrasound.      The study was marked in EPIC for immediate notification.         Workstation performed: [id number]                    Procedures  Procedures         ED Course                               SBIRT 20yo+      Flowsheet Row Most Recent Value   Initial Alcohol Screen: US AUDIT-C     1. How often do you have a drink containing alcohol? 0 Filed at: 02/06/2024 0627   2. How many drinks containing alcohol do you have on a typical day you are drinking?  0 Filed at: 02/06/2024 1349   Audit-C Score 0 Filed at: 02/06/2024 1343   RAQUEL: How many times in the past year have you...    Used an illegal drug or used a prescription medication for non-medical reasons? Never Filed at: 02/06/2024 134                      Medical Decision Making  43 y/o female here for right flank pain, change in urination  Differential diagnosis including but not limited to: pyelonephritis, ureterolithiasis, obstructing ureterolithiasis, muscular strain,  appendicitis, pregnancy, ectopic pregnancy, ovarian cyst/rupture/torsion   Assessment: abdominal pain, nausea, vomiting, ovarian cyst  Plan:  patients pain and nausea completely resolved with medications. Labwork and urine sample negative. Low clinical suspicion of pyelonephritis with negative urine. CT recommended pelvic U/S which showed 2 ovarian cysts, symptoms could have been ruptured ovarian cyst. Prescribed zofran as needed for nausesa and advised   patient to f/u with her OBGYN. She  was given strict return to ER precautions both verbally and in discharge papers. Patient verbalized understanding and agrees with plan.      Amount and/or Complexity of Data Reviewed  External Data Reviewed: notes.     Details: OBGYN visit from 9/7/22 reviewed   Labs: ordered.  Radiology:  ordered.    Risk  Prescription drug management.             Disposition  Final diagnoses:   Abdominal pain   Nausea and vomiting   Ovarian cyst     Time reflects when diagnosis was documented in both MDM as applicable and the Disposition within this note       Time User Action Codes Description Comment    2/6/2024  7:47 PM Sejal Mariscal [R10.9] Abdominal pain     2/6/2024  7:48 PM Sejal Mariscal [R11.2] Nausea and vomiting     2/6/2024  7:48 PM Sejal Mariscal [N83.209] Ovarian cyst           ED Disposition       ED Disposition   Discharge    Condition   Stable    Date/Time   Tue Feb 6, 2024 1947    Comment   Yoselyn Rojas discharge to home/self care.                   Follow-up Information       Follow up With Specialties Details Why Contact Info Additional Information    OBGYN  Schedule an appointment as soon as possible for a visit         [facility] Emergency Department Emergency Medicine Go to  If symptoms worsen [address] [facility] [address] [phone] [facility] Emergency Department, [address] [place], [place] [address]            Discharge Medication List as of 2/6/2024  7:51 PM        START taking these medications    Details   ondansetron (ZOFRAN-ODT) 4 mg disintegrating tablet Take 1 tablet (4 mg total) by mouth every 6 (six) hours as needed for nausea or vomiting, Starting Tue 2/6/2024, Normal             No discharge procedures on file.    PDMP Review       None            ED Provider  Electronically Signed by             Sejal Mariscal PA-C  02/07/24 4425

## 2024-02-07 ENCOUNTER — APPOINTMENT (OUTPATIENT)
Dept: PHYSICAL THERAPY | Facility: CLINIC | Age: 44
End: 2024-02-07
Payer: OTHER MISCELLANEOUS

## 2024-02-07 DIAGNOSIS — M25.511 ACUTE PAIN OF RIGHT SHOULDER: Primary | ICD-10-CM

## 2024-02-07 NOTE — PROGRESS NOTES
"Daily Note     Today's date: 2024  Patient name: Yoselyn Xie  : 1980  MRN: [id number]  Referring provider: Hima Grove DO  Dx:   Encounter Diagnosis     ICD-10-CM    1. Acute pain of right shoulder  M25.511                      Subjective: pt states   that she is sore but that she is feeling better.  She feels like the manuals to scapula helped last visit.       Objective: See treatment diary below      Assessment: Pt   with most tightness in UT and levator and forearm.  More TTP over MT and rhomboid of right shoulder.  .VC for head position during pec stretch in doorway. Pt is performing tpr at home with tennis ball.        Plan: Continue per plan of care.      Precautions: None    Manuals 2/5 2/7    1/8 1/10 1/15 1/25 1/31   PROM Right shoulder DL     RN RN RN RN LQ   Shoulder mobs, inf/post Grade 2 - 3 right shoulder             IASTM/STM right forearm extensor muscles Stm forearm-DL            TPR/STM R rhomboids/UT DL     RN RN RN RN LQ   SOR DL     RN RN RN RN LQ   STM Pec domenico/min DL     RN RN RN RN LQ   Neuro Re-Ed             Ball roll on wall             T band scapular slides up wall             T band wall clock             Wall push ups             Scapular retraction      10x5'' 10x5'' 10x5''  10x5\"   Chin tucks      10x5'' supine 10x5'' supine 10x5'' supine 10x5'' supine 15x5'' supine   Shoulder wall slides flexion        x10     Ther Ex             Pulleys flex/scap             Supine cane shldr flex             Table slides flex/scap/ER             Wall slides flex/scaption             Serratus punch supine             T band rows/ext             T band ER/IR             Doorway stretch             UT s'      10x5''       Self ball massage w/ towel to rhomobids/UT             Seated rhomboid s'             Ball roll outs 5\" 2x10     2x10x5''  2x10x5'' 2x10x5'' 2x10x5''   Seated thoracic ext s'      2x10x5''       Doorway pec s' 5\" x20     2x10x5''  2x10x0'' 2x10x5'' 2x10x5'' " "  Doorway rhomboid s' 5\"x20     2x10x5''  2x10x5'' 2x10x5'' 2x10x5''   Ther Activity             UBE                          Gait Training                                       Modalities             CP             MH Pre supine      8' pre seated  8' seated pre 8' seated pre 8' seated pre                                  "

## 2024-02-12 ENCOUNTER — OFFICE VISIT (OUTPATIENT)
Dept: PHYSICAL THERAPY | Facility: CLINIC | Age: 44
End: 2024-02-12
Payer: OTHER MISCELLANEOUS

## 2024-02-12 DIAGNOSIS — M25.511 ACUTE PAIN OF RIGHT SHOULDER: Primary | ICD-10-CM

## 2024-02-12 PROCEDURE — 97010 HOT OR COLD PACKS THERAPY: CPT | Performed by: PHYSICAL THERAPIST

## 2024-02-12 PROCEDURE — 97140 MANUAL THERAPY 1/> REGIONS: CPT | Performed by: PHYSICAL THERAPIST

## 2024-02-12 NOTE — PROGRESS NOTES
Daily Note     Today's date: 2024  Patient name: Yoselyn Xie  : 1980  MRN: [id number]  Referring provider: Hima Grove DO  Dx:   Encounter Diagnosis     ICD-10-CM    1. Acute pain of right shoulder  M25.511                      Subjective: pt states that she missed last visit due to food poisoning and is feeling better from that. She notes that around her right infraspinatus she is feeling pain and tightness that led to her having to take motrin 800 because she hasn't been able to make PT. She notes that she still hasn't gotten her muscle relaxers. She isn't experiencing the nerve pain travelling down her right arm as much.         Objective: See treatment diary below      Assessment: Pt tolerated treatment poorly. Patient was experiencing tingling/pain up to a 9/10 through her right upper extremity to her digits when performing PROM of the right shoulder. Performed oscillations at the point of pain during PROM of the right shoulder with a negative response. Patient had multiple trigger points around the right shoulder girdle. Specifically they were present at the origin of the right levator scapulae, medial to right scapula with multiple from the superior border to the inferior border, right upper trapezius, and at the right infraspinatus. Patient would benefit from continuing skilled physical therapy to decrease the amount of trigger points she has, increase A/PROM of the cervical spine, and increase A/PROM of the right shoulder to be able to perform full duty at work without difficulty.     Treatment was performed by Ronald Cortés (SPT) under the supervision of Braydon Keating (DPT).    Plan: Continue per plan of care.      Precautions: None    Manuals 2/5 2/12    1/8 1/10 1/15 1/25 1/31   PROM Right shoulder DL MG    RN RN RN RN LQ   Shoulder mobs, inf/post Grade 2 - 3 right shoulder             IASTM/STM right forearm extensor muscles Stm forearm-DL            TPR/STM R rhomboids/UT DL  "MG    RN RN RN RN LQ   SOR DL     RN RN RN RN LQ   STM Pec domenico/min DL     RN RN RN RN LQ   C/S sidebends  MG           Neuro Re-Ed             Ball roll on wall             T band scapular slides up wall             T band wall clock             Wall push ups             Scapular retraction      10x5'' 10x5'' 10x5''  10x5\"   Chin tucks      10x5'' supine 10x5'' supine 10x5'' supine 10x5'' supine 15x5'' supine   Shoulder wall slides flexion        x10     Ther Ex             Pulleys flex/scap             Supine cane shldr flex             Table slides flex/scap/ER             Wall slides flex/scaption             Serratus punch supine             T band rows/ext             T band ER/IR             Doorway stretch             UT s'      10x5''       Self ball massage w/ towel to rhomobids/UT             Seated rhomboid s'             Ball roll outs 5\" 2x10     2x10x5''  2x10x5'' 2x10x5'' 2x10x5''   Seated thoracic ext s'      2x10x5''       Doorway pec s' 5\" x20     2x10x5''  2x10x0'' 2x10x5'' 2x10x5''   Doorway rhomboid s' 5\"x20     2x10x5''  2x10x5'' 2x10x5'' 2x10x5''   Ther Activity             UBE                          Gait Training                                       Modalities             CP             MH Pre supine  Pre   supine    8' pre seated  8' seated pre 8' seated pre 8' seated pre                                  "

## 2024-02-14 ENCOUNTER — APPOINTMENT (OUTPATIENT)
Dept: PHYSICAL THERAPY | Facility: CLINIC | Age: 44
End: 2024-02-14
Payer: OTHER MISCELLANEOUS

## 2024-02-20 ENCOUNTER — APPOINTMENT (OUTPATIENT)
Dept: URGENT CARE | Facility: CLINIC | Age: 44
End: 2024-02-20
Payer: OTHER MISCELLANEOUS

## 2024-02-20 PROCEDURE — 99213 OFFICE O/P EST LOW 20 MIN: CPT

## 2024-02-28 ENCOUNTER — OFFICE VISIT (OUTPATIENT)
Dept: PHYSICAL THERAPY | Facility: CLINIC | Age: 44
End: 2024-02-28
Payer: OTHER MISCELLANEOUS

## 2024-02-28 DIAGNOSIS — M25.511 ACUTE PAIN OF RIGHT SHOULDER: Primary | ICD-10-CM

## 2024-02-28 PROCEDURE — 97140 MANUAL THERAPY 1/> REGIONS: CPT | Performed by: PHYSICAL THERAPIST

## 2024-02-28 NOTE — PROGRESS NOTES
"Daily Note     Today's date: 2024  Patient name: Yoselyn Xie  : 1980  MRN: [id number]  Referring provider: Hima Grove DO  Dx:   Encounter Diagnosis     ICD-10-CM    1. Acute pain of right shoulder  M25.511                      Subjective: Giuliana explains that she did take one of the muscle relaxers but it tired her out so much, she is planning on being more consistent with them moving forward.       Objective: See treatment diary below      Assessment: Tolerated treatment well. Patient demonstrated fatigue post treatment and exhibited good technique with therapeutic exercises. Significantly increased restrictions in upper trapezius and surrounding periscapulars, increased nerve irritation this session. Encouraged her to follow through with the muscle relaxers, will get back into 2x/week PT sessions next week and progress as tolerated.       Plan: Continue per plan of care.      Precautions: None    Manuals 2/5 2/12 2/28   1/8 1/10 1/15 1/25 1/31   PROM Right shoulder DL MG RN   RN RN RN RN LQ   Shoulder mobs, inf/post Grade 2 - 3 right shoulder             IASTM/STM right forearm extensor muscles Stm forearm-DL            TPR/STM R rhomboids/UT DL MG RN   RN RN RN RN LQ   SOR DL     RN RN RN RN LQ   STM Pec domenico/min DL     RN RN RN RN LQ   C/S sidebends  MG RN          Neuro Re-Ed             Ball roll on wall             T band scapular slides up wall             T band wall clock             Wall push ups             Scapular retraction      10x5'' 10x5'' 10x5''  10x5\"   Chin tucks      10x5'' supine 10x5'' supine 10x5'' supine 10x5'' supine 15x5'' supine   Shoulder wall slides flexion        x10     Ther Ex             Pulleys flex/scap             Supine cane shldr flex             Table slides flex/scap/ER             Wall slides flex/scaption             Serratus punch supine             T band rows/ext             T band ER/IR             Doorway stretch             UT s'      10x5''     " "  Self ball massage w/ towel to rhomobids/UT             Seated rhomboid s'             Ball roll outs 5\" 2x10     2x10x5''  2x10x5'' 2x10x5'' 2x10x5''   Seated thoracic ext s'      2x10x5''       Doorway pec s' 5\" x20     2x10x5''  2x10x0'' 2x10x5'' 2x10x5''   Doorway rhomboid s' 5\"x20     2x10x5''  2x10x5'' 2x10x5'' 2x10x5''   Ther Activity             UBE                          Gait Training                                       Modalities             CP             MH Pre supine  Pre   supine 8' pre supine   8' pre seated  8' seated pre 8' seated pre 8' seated pre                                    "

## 2024-03-04 ENCOUNTER — OFFICE VISIT (OUTPATIENT)
Dept: PHYSICAL THERAPY | Facility: CLINIC | Age: 44
End: 2024-03-04
Payer: OTHER MISCELLANEOUS

## 2024-03-04 DIAGNOSIS — M25.511 ACUTE PAIN OF RIGHT SHOULDER: Primary | ICD-10-CM

## 2024-03-04 PROCEDURE — 97140 MANUAL THERAPY 1/> REGIONS: CPT | Performed by: PHYSICAL THERAPIST

## 2024-03-04 NOTE — PROGRESS NOTES
"Daily Note     Today's date: 3/4/2024  Patient name: Yoselyn iXe  : 1980  MRN: [id number]  Referring provider: Hima Grove DO  Dx:   Encounter Diagnosis     ICD-10-CM    1. Acute pain of right shoulder  M25.511                      Subjective: Giuliana explains that her neck has been feeling better, but still gives her problems.      Objective: See treatment diary below      Assessment: Tolerated treatment fair. Patient demonstrated fatigue post treatment and exhibited good technique with therapeutic exercises. Continued encouragement with her muscle relaxer consistency, continues to remain tight throughout her upper trapezius and surrounding periscapulars, nerve irritation less this afternoon.    Plan: Continue per plan of care.      Precautions: None    Manuals 2/5 2/12 2/28 3/4  1/8 1/10 1/15 1/25 1/31   PROM Right shoulder DL MG RN RN  RN RN RN RN LQ   Shoulder mobs, inf/post Grade 2 - 3 right shoulder             IASTM/STM right forearm extensor muscles Stm forearm-DL            TPR/STM R rhomboids/UT DL MG RN RN  RN RN RN RN LQ   SOR DL   RN  RN RN RN RN LQ   STM Pec domenico/min DL   RN  RN RN RN RN LQ   C/S sidebends  MG RN RN         Neuro Re-Ed             Ball roll on wall             T band scapular slides up wall             T band wall clock             Supine cervical rotation    10x5'' e         Scapular retraction      10x5'' 10x5'' 10x5''  10x5\"   Chin tucks    10x5'' supine  10x5'' supine 10x5'' supine 10x5'' supine 10x5'' supine 15x5'' supine   Shoulder wall slides flexion        x10     Ther Ex             Pulleys flex/scap             Supine cane shldr flex             Table slides flex/scap/ER             Wall slides flex/scaption             Serratus punch supine             T band rows/ext             T band ER/IR             Doorway stretch             UT s'    10x5'' ea  10x5''       Self ball massage w/ towel to rhomobids/UT             Seated rhomboid s'             Ball roll " "outs 5\" 2x10     2x10x5''  2x10x5'' 2x10x5'' 2x10x5''   Seated thoracic ext s'      2x10x5''       Doorway pec s' 5\" x20     2x10x5''  2x10x0'' 2x10x5'' 2x10x5''   Doorway rhomboid s' 5\"x20     2x10x5''  2x10x5'' 2x10x5'' 2x10x5''   Ther Activity             UBE                          Gait Training                                       Modalities             CP             MH Pre supine  Pre   supine 8' pre supine 8' pre supine  8' pre seated  8' seated pre 8' seated pre 8' seated pre                                      "

## 2024-03-06 ENCOUNTER — OFFICE VISIT (OUTPATIENT)
Dept: PHYSICAL THERAPY | Facility: CLINIC | Age: 44
End: 2024-03-06
Payer: OTHER MISCELLANEOUS

## 2024-03-06 DIAGNOSIS — M25.511 ACUTE PAIN OF RIGHT SHOULDER: Primary | ICD-10-CM

## 2024-03-06 PROCEDURE — 97140 MANUAL THERAPY 1/> REGIONS: CPT | Performed by: PHYSICAL THERAPIST

## 2024-03-06 NOTE — PROGRESS NOTES
"Daily Note     Today's date: 3/6/2024  Patient name: Yoselyn Xie  : 1980  MRN: [id number]  Referring provider: Hima Grove DO  Dx:   Encounter Diagnosis     ICD-10-CM    1. Acute pain of right shoulder  M25.511                      Subjective: Giuliana explains that her nerve pain is present more today, she feels like the weather is an issue.      Objective: See treatment diary below      Assessment: Tolerated treatment fair. Patient demonstrated fatigue post treatment and exhibited good technique with therapeutic exercises. Continued encouragement with her muscle relaxer consistency, continues to remain tight throughout her upper trapezius and surrounding periscapulars, soft tissue mobilization provided in sidelying today to access rhomboids and middle trapezius better, increased nerve irritation as compared to last session.     Plan: Continue per plan of care.      Precautions: None    Manuals 2/5 2/12 2/28 3/4 3/6 1/8 1/10 1/15 1/25 1/31   PROM Right shoulder DL MG RN RN RN RN RN RN RN LQ   Shoulder mobs, inf/post Grade 2 - 3 right shoulder             IASTM/STM right forearm extensor muscles Stm forearm-DL            TPR/STM R rhomboids/UT DL MG RN RN RN RN RN RN RN LQ   SOR DL   RN RN RN RN RN RN LQ   STM Pec domenico/min DL   RN RN RN RN RN RN LQ   C/S sidebends  MG RN RN RN        Neuro Re-Ed             Ball roll on wall             T band scapular slides up wall             T band wall clock             Supine cervical rotation    10x5'' e         Scapular retraction      10x5'' 10x5'' 10x5''  10x5\"   Chin tucks    10x5'' supine  10x5'' supine 10x5'' supine 10x5'' supine 10x5'' supine 15x5'' supine   Shoulder wall slides flexion        x10     Ther Ex             Pulleys flex/scap             Supine cane shldr flex             Table slides flex/scap/ER             Wall slides flex/scaption             Serratus punch supine             T band rows/ext             T band ER/IR             Doorway " "stretch             UT s'    10x5'' ea  10x5''       Self ball massage w/ towel to rhomobids/UT             Seated rhomboid s'             Ball roll outs 5\" 2x10     2x10x5''  2x10x5'' 2x10x5'' 2x10x5''   Seated thoracic ext s'      2x10x5''       Doorway pec s' 5\" x20     2x10x5''  2x10x0'' 2x10x5'' 2x10x5''   Doorway rhomboid s' 5\"x20     2x10x5''  2x10x5'' 2x10x5'' 2x10x5''   Ther Activity             UBE                          Gait Training                                       Modalities             CP             MH Pre supine  Pre   supine 8' pre supine 8' pre supine 8' pre supine 8' pre seated  8' seated pre 8' seated pre 8' seated pre                                      "

## 2024-03-11 ENCOUNTER — EVALUATION (OUTPATIENT)
Dept: PHYSICAL THERAPY | Facility: CLINIC | Age: 44
End: 2024-03-11
Payer: OTHER MISCELLANEOUS

## 2024-03-11 DIAGNOSIS — M25.511 ACUTE PAIN OF RIGHT SHOULDER: Primary | ICD-10-CM

## 2024-03-11 PROCEDURE — 97140 MANUAL THERAPY 1/> REGIONS: CPT | Performed by: PHYSICAL THERAPIST

## 2024-03-11 PROCEDURE — 97164 PT RE-EVAL EST PLAN CARE: CPT | Performed by: PHYSICAL THERAPIST
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
## 2024-03-13 ENCOUNTER — APPOINTMENT (OUTPATIENT)
Dept: PHYSICAL THERAPY | Facility: CLINIC | Age: 44
End: 2024-03-13
Payer: OTHER MISCELLANEOUS

## 2024-03-19 ENCOUNTER — APPOINTMENT (OUTPATIENT)
Dept: URGENT CARE | Facility: CLINIC | Age: 44
End: 2024-03-19
Payer: OTHER MISCELLANEOUS

## 2024-03-19 PROCEDURE — 99213 OFFICE O/P EST LOW 20 MIN: CPT

## 2024-03-25 ENCOUNTER — OFFICE VISIT (OUTPATIENT)
Dept: PHYSICAL THERAPY | Facility: CLINIC | Age: 44
End: 2024-03-25
Payer: OTHER MISCELLANEOUS

## 2024-03-25 DIAGNOSIS — M25.511 ACUTE PAIN OF RIGHT SHOULDER: Primary | ICD-10-CM

## 2024-03-25 PROCEDURE — 97140 MANUAL THERAPY 1/> REGIONS: CPT | Performed by: PHYSICAL THERAPIST

## 2024-03-25 NOTE — PROGRESS NOTES
Daily Note     Today's date: 3/25/2024  Patient name: Yoselyn Xie  : 1980  MRN: [id number]  Referring provider: Hima Grove DO  Dx:   Encounter Diagnosis     ICD-10-CM    1. Acute pain of right shoulder  M25.511                      Subjective: The patient reports small improvements in pain and tightness but still notes consistent high pain levels and tightness around the shoulder.       Objective: See treatment diary below      Assessment: The patient demonstrated fair tolerance to manuals. TPR performed along scapula with favorable response. The patient remains very tight and sensitive along the mid UT. Added biceps IASTM back into program at patient request. Appropriate erythema noted along proximal extensors where the most tightness was present. Empty end feel for all shoulder PROM with moderate to high guarding noted.        Plan: Continue per plan of care.      Precautions: None    Access Code: [email or web address]  URL: [email or web address]  Date: 2023  Prepared by: Tila Negrete    Exercises  - Seated Shoulder Flexion Towel Slide at Table Top  - 1 x daily - 7 x weekly - 1 sets - 10 reps - 5 s hold  - Seated Shoulder Scaption Slide at Table Top with Forearm in Neutral  - 1 x daily - 7 x weekly - 1 sets - 10 reps - 5 s hold  - Seated Gentle Upper Trapezius Stretch  - 1 x daily - 7 x weekly - 1 sets - 10 reps - 10 s hold  - Standing Backward Shoulder Rolls  - 1 x daily - 7 x weekly - 1 sets - 10 reps  - Standing shoulder flexion wall slides  - 1 x daily - 7 x weekly - 1 sets - 10 reps - 5 s hold  - Standing Wrist Flexion Stretch  - 1 x daily - 7 x weekly - 1 sets - 10 reps - 5 s hold    Precautions: None    Manuals  3/4 3/6 3/25 1/10 1/15 1/25 1/31   PROM Right shoulder DL MG RN RN RN NB RN RN RN LQ   Shoulder mobs, inf/post Grade 2 - 3 right shoulder             IASTM/STM right forearm extensor muscles Stm forearm-DL     NB       TPR/STM R rhomboids/UT DL MG RN RN RN  "NB RN RN RN LQ   SOR DL   RN RN NB RN RN RN LQ   STM Pec domenico/min DL   RN RN NB RN RN RN LQ   C/S sidebends  MG RN RN RN NB       Neuro Re-Ed             Ball roll on wall             T band scapular slides up wall             T band wall clock             Supine cervical rotation    10x5'' e         Scapular retraction       10x5'' 10x5''  10x5\"   Chin tucks    10x5'' supine   10x5'' supine 10x5'' supine 10x5'' supine 15x5'' supine   Shoulder wall slides flexion        x10     Ther Ex             Pulleys flex/scap             Supine cane shldr flex             Table slides flex/scap/ER             Wall slides flex/scaption             Serratus punch supine             T band rows/ext             T band ER/IR             Doorway stretch             UT s'    10x5'' ea         Self ball massage w/ towel to rhomobids/UT             Seated rhomboid s'             Ball roll outs 5\" 2x10       2x10x5'' 2x10x5'' 2x10x5''   Seated thoracic ext s'             Doorway pec s' 5\" x20       2x10x0'' 2x10x5'' 2x10x5''   Doorway rhomboid s' 5\"x20       2x10x5'' 2x10x5'' 2x10x5''   Ther Activity             UBE                          Gait Training                                       Modalities             CP             MH Pre supine  Pre   supine 8' pre supine 8' pre supine 8' pre supine 8' pre seated  8' seated pre 8' seated pre 8' seated pre                   "

## 2024-03-27 ENCOUNTER — OFFICE VISIT (OUTPATIENT)
Dept: PHYSICAL THERAPY | Facility: CLINIC | Age: 44
End: 2024-03-27
Payer: OTHER MISCELLANEOUS

## 2024-03-27 DIAGNOSIS — M25.511 ACUTE PAIN OF RIGHT SHOULDER: Primary | ICD-10-CM

## 2024-03-27 PROCEDURE — 97140 MANUAL THERAPY 1/> REGIONS: CPT
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
## 2024-04-03 ENCOUNTER — APPOINTMENT (OUTPATIENT)
Dept: PHYSICAL THERAPY | Facility: CLINIC | Age: 44
End: 2024-04-03
Payer: OTHER MISCELLANEOUS

## 2024-04-10 ENCOUNTER — OFFICE VISIT (OUTPATIENT)
Dept: PHYSICAL THERAPY | Facility: CLINIC | Age: 44
End: 2024-04-10
Payer: OTHER MISCELLANEOUS

## 2024-04-10 DIAGNOSIS — M25.511 ACUTE PAIN OF RIGHT SHOULDER: Primary | ICD-10-CM

## 2024-04-10 PROCEDURE — 97140 MANUAL THERAPY 1/> REGIONS: CPT

## 2024-04-10 NOTE — PROGRESS NOTES
Daily Note     Today's date: 4/10/2024  Patient name: Yoselyn Xie  : 1980  MRN: [id number]  Referring provider: Hima Grove DO  Dx:   Encounter Diagnosis     ICD-10-CM    1. Acute pain of right shoulder  M25.511                      Subjective: Giuliana felt better after LV, short term relief. She continued to c/o restriction in the shoulder and upper back.       Objective: See treatment diary below      Assessment: Tolerated treatment fair. Favorable response to STM and shoulder PROM. Less restrictions noted in the mid trap vs last session. Less nerve irritation noted as session progressed. Fair tolerance to IASTM to extensor mass today. Continued PT would be beneficial to improve function.      Plan: Continue per plan of care.       Precautions: None    Access Code: [email or web address]  URL: [email or web address]  Date: 2023  Prepared by: Tila Negrete    Exercises  - Seated Shoulder Flexion Towel Slide at Table Top  - 1 x daily - 7 x weekly - 1 sets - 10 reps - 5 s hold  - Seated Shoulder Scaption Slide at Table Top with Forearm in Neutral  - 1 x daily - 7 x weekly - 1 sets - 10 reps - 5 s hold  - Seated Gentle Upper Trapezius Stretch  - 1 x daily - 7 x weekly - 1 sets - 10 reps - 10 s hold  - Standing Backward Shoulder Rolls  - 1 x daily - 7 x weekly - 1 sets - 10 reps  - Standing shoulder flexion wall slides  - 1 x daily - 7 x weekly - 1 sets - 10 reps - 5 s hold  - Standing Wrist Flexion Stretch  - 1 x daily - 7 x weekly - 1 sets - 10 reps - 5 s hold    Precautions: None    Manuals 2/ 3/ 3/6 3/25 3/27 4/10  1/31   PROM Right shoulder DL MG RN RN RN NB MB MB  LQ   Shoulder mobs, inf/post Grade 2 - 3 right shoulder             IASTM/STM right forearm extensor muscles Stm forearm-DL     NB  MB gentle     TPR/STM R rhomboids/UT DL MG RN RN RN NB MB MB  LQ   SOR DL   RN RN NB MB MB  LQ   STM Pec domenico/min DL   RN RN NB MB MB  LQ   C/S sidebends  MG RN RN RN DARIUS SIERRA MB     Neuro  "Re-Ed             Ball roll on wall             T band scapular slides up wall             T band wall clock             Supine cervical rotation    10x5'' e         Scapular retraction          10x5\"   Chin tucks    10x5'' supine      15x5'' supine   Shoulder wall slides flexion             Ther Ex             Pulleys flex/scap             Supine cane shldr flex             Table slides flex/scap/ER             Wall slides flex/scaption             Serratus punch supine             T band rows/ext             T band ER/IR             Doorway stretch             UT s'    10x5'' ea         Self ball massage w/ towel to rhomobids/UT             Seated rhomboid s'             Ball roll outs 5\" 2x10         2x10x5''   Seated thoracic ext s'             Doorway pec s' 5\" x20         2x10x5''   Doorway rhomboid s' 5\"x20         2x10x5''   Ther Activity             UBE                          Gait Training                                       Modalities             CP             MH Pre supine  Pre   supine 8' pre supine 8' pre supine 8' pre supine 8' pre seated 8' pre supine 8' pre supine  8' seated pre                          "

## 2024-05-02 ENCOUNTER — OFFICE VISIT (OUTPATIENT)
Dept: PHYSICAL THERAPY | Facility: CLINIC | Age: 44
End: 2024-05-02
Payer: OTHER MISCELLANEOUS

## 2024-05-02 DIAGNOSIS — M25.511 ACUTE PAIN OF RIGHT SHOULDER: Primary | ICD-10-CM

## 2024-05-02 PROCEDURE — 97164 PT RE-EVAL EST PLAN CARE: CPT | Performed by: PHYSICAL THERAPIST

## 2024-05-02 PROCEDURE — 97140 MANUAL THERAPY 1/> REGIONS: CPT | Performed by: PHYSICAL THERAPIST
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
## 2024-05-09 ENCOUNTER — APPOINTMENT (OUTPATIENT)
Dept: PHYSICAL THERAPY | Facility: CLINIC | Age: 44
End: 2024-05-09
Payer: OTHER MISCELLANEOUS

## 2024-05-22 ENCOUNTER — OFFICE VISIT (OUTPATIENT)
Dept: PHYSICAL THERAPY | Facility: CLINIC | Age: 44
End: 2024-05-22
Payer: OTHER MISCELLANEOUS

## 2024-05-22 DIAGNOSIS — M25.511 ACUTE PAIN OF RIGHT SHOULDER: Primary | ICD-10-CM

## 2024-05-22 PROCEDURE — 97110 THERAPEUTIC EXERCISES: CPT

## 2024-05-22 PROCEDURE — 97140 MANUAL THERAPY 1/> REGIONS: CPT

## 2024-05-22 NOTE — PROGRESS NOTES
"Daily Note     Today's date: 2024  Patient name: Yoselyn Xie  : 1980  MRN: [id number]  Referring provider: Hima Grove DO  Dx:   Encounter Diagnosis     ICD-10-CM    1. Acute pain of right shoulder  M25.511                      Subjective: Patient reports pain is increase in both shoulders today.   Patient reports a pain level of 10/10 today.        Objective: See treatment diary below      Assessment: Tolerated treatment well. Patient demonstrated fatigue post treatment, exhibited good technique with therapeutic exercises, and would benefit from continued PT.  Patient demonstrates improved shoulder mobility following exercises.   Patient provides increased resistance with passive mobility of right shoulder.  Patient was able to tolerate exercise routine with additional mobility activities.  No change in pain level following exercises.        Plan: Continue per plan of care.      Precautions: None       Manuals 2/5 2/12 2/28 3/4 3/6 3/25 3/27 4/10  5/2 5/22   PROM Right shoulder DL MG RN RN RN NB MB MB  RN KSG   Shoulder mobs, inf/post Grade 2 - 3 right shoulder                       IASTM/STM right forearm extensor muscles Stm forearm-DL         NB   MB gentle      TPR/STM R rhomboids/UT DL MG RN RN RN NB MB MB RN     SOR DL     RN RN NB MB MB  RN    STM Pec domenico/min DL     RN RN NB MB MB  RN    C/S sidebends   MG RN RN RN NB MB MB  RN     Neuro Re-Ed                       Ball roll on wall                       T band scapular slides up wall                       T band wall clock                       Supine cervical rotation       10x5'' e            x10 ea   Scapular retraction                   10x5\"   Chin tucks       10x5'' supine           10x 5'' supine   Shoulder wall slides flexion                    x10 bilateral   Ther Ex                       Pulleys flex/scap                    flex 2'   Supine cane shldr flex                    x10   Table slides flex/scap/ER                  " "  bilateral shldr flexion x10   Cane shoulder ext          x10                                          Wall slides flex/scaption                       Serratus punch supine                       T band rows/ext                       T band ER/IR                       Doorway stretch                       UT s'       10x5'' ea               Self ball massage w/ towel to rhomobids/UT                       Seated rhomboid s'                       Ball roll outs 5\" 2x10                 5\" x10   Seated thoracic ext s'                    5\" x10   Doorway pec s' 5\" x20                 10\" x10   Doorway rhomboid s' 5\"x20                    Ther Activity                       UBE                                               Gait Training                                                                       Modalities                       CP                       MH Pre supine  Pre   supine 8' pre supine 8' pre supine 8' pre supine 8' pre seated 8' pre supine 8' pre supine  8' pre supine 8'   Supine pre                 "

## 2024-05-29 ENCOUNTER — OFFICE VISIT (OUTPATIENT)
Dept: PHYSICAL THERAPY | Facility: CLINIC | Age: 44
End: 2024-05-29
Payer: OTHER MISCELLANEOUS

## 2024-05-29 ENCOUNTER — APPOINTMENT (OUTPATIENT)
Dept: URGENT CARE | Facility: CLINIC | Age: 44
End: 2024-05-29
Payer: OTHER MISCELLANEOUS

## 2024-05-29 ENCOUNTER — APPOINTMENT (OUTPATIENT)
Dept: PHYSICAL THERAPY | Facility: CLINIC | Age: 44
End: 2024-05-29
Payer: OTHER MISCELLANEOUS

## 2024-05-29 DIAGNOSIS — M25.511 ACUTE PAIN OF RIGHT SHOULDER: Primary | ICD-10-CM

## 2024-05-29 PROCEDURE — 97140 MANUAL THERAPY 1/> REGIONS: CPT | Performed by: PHYSICAL THERAPIST

## 2024-05-29 PROCEDURE — 99213 OFFICE O/P EST LOW 20 MIN: CPT | Performed by: FAMILY MEDICINE

## 2024-05-29 NOTE — PROGRESS NOTES
"Daily Note     Today's date: 2024  Patient name: Yoselyn Xie  : 1980  MRN: [id number]  Referring provider: Hima Grove DO  Dx:   Encounter Diagnosis     ICD-10-CM    1. Acute pain of right shoulder  M25.511                      Subjective: Giuliana explains that she was at urgent care this morning with similar symptoms into her left upper extremity noting numbness and tingling as well as pain into the upper trapezius. She notes that she started prednisone and flexeril today with mild relief so far. 9/10 pain upon arrival.       Objective: See treatment diary below      Assessment: Tolerated treatment well. Patient demonstrated fatigue post treatment. Giuliana continues to have significant restrictions throughout B UT, tenderness noted and minimal improvement with soft tissue mobilization and trigger point release. Continue to emphasize soft tissue mobilization.       Plan: Continue per plan of care.      Precautions: None       Manuals    PROM Right shoulder          RN KSG   Shoulder mobs, inf/post Grade 2 - 3 right shoulder                IASTM/STM right forearm extensor muscles              TPR/STM R rhomboids/UT RN B        RN     SOR RN         RN    STM Pec domenico/min RN B         RN    C/S sidebends RN         RN     Neuro Re-Ed                Ball roll on wall                T band scapular slides up wall                T band wall clock                Supine cervical rotation             x10 ea   Scapular retraction            10x5\"   Chin tucks            10x 5'' supine   Shoulder wall slides flexion             x10 bilateral   Ther Ex                Pulleys flex/scap             flex 2'   Supine cane shldr flex             x10   Table slides flex/scap/ER             bilateral shldr flexion x10   Cane shoulder ext          x10                                          Wall slides flex/scaption                Serratus punch supine                T band rows/ext              " "  T band ER/IR                Doorway stretch                UT s'                Self ball massage w/ towel to rhomobids/UT                Seated rhomboid s'               Ball roll outs           5\" x10   Seated thoracic ext s'            5\" x10   Doorway pec s'           10\" x10   Doorway rhomboid s'              Ther Activity               UBE                               Gait Training                                                  Modalities                CP                          8' pre supine 8'   Supine pre                   "

## 2024-06-04 ENCOUNTER — APPOINTMENT (OUTPATIENT)
Dept: PHYSICAL THERAPY | Facility: CLINIC | Age: 44
End: 2024-06-04
Payer: OTHER MISCELLANEOUS

## 2024-06-05 ENCOUNTER — APPOINTMENT (OUTPATIENT)
Dept: URGENT CARE | Facility: CLINIC | Age: 44
End: 2024-06-05
Payer: OTHER MISCELLANEOUS

## 2024-06-05 PROCEDURE — 99213 OFFICE O/P EST LOW 20 MIN: CPT

## 2024-06-06 ENCOUNTER — OFFICE VISIT (OUTPATIENT)
Dept: PHYSICAL THERAPY | Facility: CLINIC | Age: 44
End: 2024-06-06
Payer: OTHER MISCELLANEOUS

## 2024-06-06 DIAGNOSIS — M25.511 ACUTE PAIN OF RIGHT SHOULDER: Primary | ICD-10-CM

## 2024-06-06 DIAGNOSIS — G89.29 CHRONIC LEFT SHOULDER PAIN: ICD-10-CM

## 2024-06-06 DIAGNOSIS — M25.512 CHRONIC LEFT SHOULDER PAIN: ICD-10-CM

## 2024-06-06 PROCEDURE — 97140 MANUAL THERAPY 1/> REGIONS: CPT | Performed by: PHYSICAL THERAPIST

## 2024-06-06 NOTE — PROGRESS NOTES
"Daily Note     Today's date: 2024  Patient name: Yoselyn Xie  : 1980  MRN: [id number]  Referring provider: Hima Grove DO  Dx:   Encounter Diagnosis     ICD-10-CM    1. Acute pain of right shoulder  M25.511       2. Chronic left shoulder pain  M25.512     G89.29                      Subjective: Giuliana explains that her shoulders are feeling overall better than last time, the prednisone seems to be helping. Arrives with a 6/10 pain today.      Objective: See treatment diary below      Assessment: Tolerated treatment well. Patient exhibited good technique with therapeutic exercises. Giuliana continues to have discomfort and trigger points throughout her upper trapezius muscles bilaterally, increase radiating pain on the left side as compared to the right. Encouraged her to continue with heat, avoiding irritating motions, consistency with her muscle relaxer and stretches. Will continue to progress mobility and reducing pain/trigger points nv. Encouraged her to consider using an s hook at home.       Plan: Continue per plan of care.      Precautions: None       Manuals    PROM Right shoulder          RN KSG   Shoulder mobs, inf/post Grade 2 - 3 right shoulder                IASTM/STM right forearm extensor muscles              TPR/STM R rhomboids/UT RN B RN B       RN     SOR RN RN        RN    STM Pec domenico/min RN B RN B        RN    C/S sidebends RN RN        RN     Neuro Re-Ed                Ball roll on wall                T band scapular slides up wall                T band wall clock                Supine cervical rotation             x10 ea   Scapular retraction            10x5\"   Chin tucks            10x 5'' supine   Shoulder wall slides flexion             x10 bilateral   Ther Ex                Pulleys flex/scap             flex 2'   Supine cane shldr flex             x10   Table slides flex/scap/ER             bilateral shldr flexion x10   Cane shoulder ext          x10 " "                                         Wall slides flex/scaption                Serratus punch supine                T band rows/ext                T band ER/IR                Doorway stretch                UT s'                Self ball massage w/ towel to rhomobids/UT                Seated rhomboid s'               Ball roll outs           5\" x10   Seated thoracic ext s'            5\" x10   Doorway pec s'           10\" x10   Doorway rhomboid s'              Ther Activity               UBE                               Gait Training                                                  Modalities                CP                MH          8' pre supine 8'   Supine pre                     "

## 2024-06-10 ENCOUNTER — OFFICE VISIT (OUTPATIENT)
Dept: PHYSICAL THERAPY | Facility: CLINIC | Age: 44
End: 2024-06-10
Payer: OTHER MISCELLANEOUS

## 2024-06-10 DIAGNOSIS — M25.511 ACUTE PAIN OF RIGHT SHOULDER: Primary | ICD-10-CM

## 2024-06-10 DIAGNOSIS — G89.29 CHRONIC LEFT SHOULDER PAIN: ICD-10-CM

## 2024-06-10 DIAGNOSIS — M25.512 CHRONIC LEFT SHOULDER PAIN: ICD-10-CM

## 2024-06-10 PROCEDURE — 97140 MANUAL THERAPY 1/> REGIONS: CPT | Performed by: PHYSICAL THERAPIST

## 2024-06-10 NOTE — PROGRESS NOTES
"Daily Note     Today's date: 6/10/2024  Patient name: Yoselyn Xie  : 1980  MRN: [id number]  Referring provider: Hima Grove DO  Dx:   Encounter Diagnosis     ICD-10-CM    1. Acute pain of right shoulder  M25.511       2. Chronic left shoulder pain  M25.512     G89.29                      Subjective: Giuliana explains that her shoulders are feeling overall better than last time, the prednisone seems to be helping.       Objective: See treatment diary below      Assessment: Tolerated treatment well. Patient exhibited good technique with therapeutic exercises. Giuliana had improved restrictions throughout her periscapular muscles and increased tolerance to soft tissue and trigger point pressure today. Encouraged her strongly to consistently perform her stretches to maximize her window of opportunity while on the medications.     Plan: Continue per plan of care.      Precautions: None       Manuals 5/29 6/6 6/10       5/2 5/22   PROM Right shoulder          RN KSG   Shoulder mobs, inf/post Grade 2 - 3 right shoulder                IASTM/STM right forearm extensor muscles              TPR/STM R rhomboids/UT RN B RN B RN B      RN     SOR RN RN RN        RN    STM Pec domenico/min RN B RN B RN B       RN    C/S sidebends RN RN RN       RN     Neuro Re-Ed                Ball roll on wall                T band scapular slides up wall                T band wall clock                Supine cervical rotation             x10 ea   Scapular retraction            10x5\"   Chin tucks            10x 5'' supine   Shoulder wall slides flexion             x10 bilateral   Ther Ex                Pulleys flex/scap             flex 2'   Supine cane shldr flex             x10   Table slides flex/scap/ER             bilateral shldr flexion x10   Cane shoulder ext          x10                                          Wall slides flex/scaption                Serratus punch supine                T band rows/ext                T band ER/IR   " "             Doorway stretch                UT s'                Self ball massage w/ towel to rhomobids/UT                Seated rhomboid s'               Ball roll outs           5\" x10   Seated thoracic ext s'            5\" x10   Doorway pec s'           10\" x10   Doorway rhomboid s'              Ther Activity               UBE                               Gait Training                                                  Modalities                CP                MH          8' pre supine 8'   Supine pre                     "

## 2024-06-20 ENCOUNTER — APPOINTMENT (OUTPATIENT)
Dept: PHYSICAL THERAPY | Facility: CLINIC | Age: 44
End: 2024-06-20
Payer: OTHER MISCELLANEOUS

## 2024-06-26 ENCOUNTER — OFFICE VISIT (OUTPATIENT)
Dept: PHYSICAL THERAPY | Facility: CLINIC | Age: 44
End: 2024-06-26
Payer: OTHER MISCELLANEOUS

## 2024-06-26 DIAGNOSIS — M25.512 CHRONIC LEFT SHOULDER PAIN: ICD-10-CM

## 2024-06-26 DIAGNOSIS — M25.511 ACUTE PAIN OF RIGHT SHOULDER: Primary | ICD-10-CM

## 2024-06-26 DIAGNOSIS — G89.29 CHRONIC LEFT SHOULDER PAIN: ICD-10-CM

## 2024-06-26 PROCEDURE — 97140 MANUAL THERAPY 1/> REGIONS: CPT | Performed by: PHYSICAL THERAPIST

## 2024-06-26 NOTE — PROGRESS NOTES
"Daily Note     Today's date: 2024  Patient name: Yoselyn Xie  : 1980  MRN: [id number]  Referring provider: Hima Grove DO  Dx:   Encounter Diagnosis     ICD-10-CM    1. Acute pain of right shoulder  M25.511       2. Chronic left shoulder pain  M25.512     G89.29                      Subjective: Giuliana notes that she is feeling better recently. She continues to have a lot numbness and discomfort but thinks the therapy and muscle relaxers have been helping.      Objective: See treatment diary below      Assessment: Tolerated treatment well. Patient demonstrated fatigue post treatment and exhibited good technique with therapeutic exercises. Giuliana had improved restrictions throughout her periscapular muscles and increased tolerance to soft tissue and trigger point pressure today. Encouraged her strongly to consistently perform her stretches to maximize her window of opportunity while on the medications.       Plan: Continue per plan of care.      Precautions: None       Manuals 5/29 6/6 6/10 6/26      5/2 5/22   PROM Right shoulder          RN KSG   Shoulder mobs, inf/post Grade 2 - 3 right shoulder                IASTM/STM right forearm extensor muscles              TPR/STM R rhomboids/UT RN B RN B RN B RN B     RN     SOR RN RN RN  RN      RN    STM Pec domenico/min RN B RN B RN B RN B      RN    C/S sidebends RN RN RN RN      RN     Neuro Re-Ed                Ball roll on wall                T band scapular slides up wall                T band wall clock                Supine cervical rotation             x10 ea   Scapular retraction            10x5\"   Chin tucks            10x 5'' supine   Shoulder wall slides flexion             x10 bilateral   Ther Ex                Pulleys flex/scap             flex 2'   Supine cane shldr flex             x10   Table slides flex/scap/ER             bilateral shldr flexion x10   Cane shoulder ext          x10                                          Wall slides " "flex/scaption                Serratus punch supine                T band rows/ext                T band ER/IR                Doorway stretch                UT s'                Self ball massage w/ towel to rhomobids/UT                Seated rhomboid s'               Ball roll outs           5\" x10   Seated thoracic ext s'            5\" x10   Doorway pec s'           10\" x10   Doorway rhomboid s'              Ther Activity               UBE                               Gait Training                                                  Modalities                CP                MH          8' pre supine 8'   Supine pre                       "

## 2024-07-24 ENCOUNTER — OFFICE VISIT (OUTPATIENT)
Dept: PHYSICAL THERAPY | Facility: CLINIC | Age: 44
End: 2024-07-24
Payer: OTHER MISCELLANEOUS

## 2024-07-24 DIAGNOSIS — M25.512 CHRONIC LEFT SHOULDER PAIN: ICD-10-CM

## 2024-07-24 DIAGNOSIS — M25.511 ACUTE PAIN OF RIGHT SHOULDER: Primary | ICD-10-CM

## 2024-07-24 DIAGNOSIS — G89.29 CHRONIC LEFT SHOULDER PAIN: ICD-10-CM

## 2024-07-24 PROCEDURE — 97140 MANUAL THERAPY 1/> REGIONS: CPT

## 2024-07-24 PROCEDURE — 97010 HOT OR COLD PACKS THERAPY: CPT

## 2024-07-24 NOTE — PROGRESS NOTES
Daily Note     Today's date: 2024  Patient name: Yoselyn Xie  : 1980  MRN: [id number]  Referring provider: Hima Grove DO  Dx:   Encounter Diagnosis     ICD-10-CM    1. Acute pain of right shoulder  M25.511       2. Chronic left shoulder pain  M25.512     G89.29           Start Time: 1417  Stop Time: 1500  Total time in clinic (min): 43 minutes    Subjective: Pt reports 6/10 pain within her shoulders and upper back. She notes that it does not really get better than a 6/10 pain, but can get worse.      Objective: See treatment diary below      Assessment: The pt participated in a skilled physical therapy session that focused on manual intervention. She presented with significant muscle tension throughout the musculature of the cervicothoracic region (e.g., upper trapezius, levator scapulae, cervical extensors, pectorals). Manual intervention was provided per pt tolerance as she presented with allodynia/hypersensitivity as well as poor positional tolerance as noted by an onset/increase in radicular symptoms with little changes in cervical or shoulder mobility. She tolerated treatment well. The pt would benefit from continued PT to address impairments in order to improve her quality of life and return to her PLOF without limitations due to pain.      Plan: Continue per plan of care. Continue POC as per primary PT.     Precautions: None       Manuals 5/29 6/6 6/10 6/26 7/24     5/2 5/22   PROM Right shoulder          RN KSG   Shoulder mobs, inf/post Grade 2 - 3 right shoulder                IASTM/STM right forearm extensor muscles              TPR/STM R rhomboids/UT RN B RN B RN B RN B KS    RN     SOR RN RN RN  RN KS     RN    STM Pec domenico/min RN B RN B RN B RN B KS     RN    C/S sidebends RN RN RN RN      RN     Neuro Re-Ed                Ball roll on wall                T band scapular slides up wall                T band wall clock                Supine cervical rotation             x10 ea  "  Scapular retraction            10x5\"   Chin tucks            10x 5'' supine   Shoulder wall slides flexion             x10 bilateral   Ther Ex                Pulleys flex/scap             flex 2'   Supine cane shldr flex             x10   Table slides flex/scap/ER             bilateral shldr flexion x10   Cane shoulder ext          x10                                          Wall slides flex/scaption                Serratus punch supine                T band rows/ext                T band ER/IR                Doorway stretch                UT s'                Self ball massage w/ towel to rhomobids/UT                Seated rhomboid s'               Ball roll outs           5\" x10   Seated thoracic ext s'            5\" x10   Doorway pec s'           10\" x10   Doorway rhomboid s'              Ther Activity               UBE                               Gait Training                                                  Modalities                CP                MH     10' pre supine     8' pre supine 8'   Supine pre                         "

## 2024-07-29 ENCOUNTER — HOSPITAL ENCOUNTER (OUTPATIENT)
Dept: NEUROLOGY | Facility: CLINIC | Age: 44
Discharge: HOME | End: 2024-07-29
Payer: OTHER MISCELLANEOUS

## 2024-07-29 DIAGNOSIS — M54.12 BRACHIAL NEURITIS: ICD-10-CM

## 2024-07-29 PROBLEM — M79.622 PAIN IN BOTH UPPER ARMS: Status: ACTIVE | Noted: 2024-07-29

## 2024-07-29 PROBLEM — R20.0 NUMBNESS AND TINGLING IN BOTH HANDS: Status: ACTIVE | Noted: 2024-07-29

## 2024-07-29 PROBLEM — M79.621 PAIN IN BOTH UPPER ARMS: Status: ACTIVE | Noted: 2024-07-29

## 2024-07-29 PROBLEM — R20.2 NUMBNESS AND TINGLING IN BOTH HANDS: Status: ACTIVE | Noted: 2024-07-29

## 2024-07-29 PROCEDURE — 95913 NRV CNDJ TEST 13/> STUDIES: CPT | Performed by: PSYCHIATRY & NEUROLOGY

## 2024-07-29 PROCEDURE — 95886 MUSC TEST DONE W/N TEST COMP: CPT | Performed by: PSYCHIATRY & NEUROLOGY

## 2024-08-01 ENCOUNTER — APPOINTMENT (OUTPATIENT)
Dept: PHYSICAL THERAPY | Facility: CLINIC | Age: 44
End: 2024-08-01
Payer: OTHER MISCELLANEOUS

## 2024-08-05 ENCOUNTER — TELEPHONE (OUTPATIENT)
Age: 44
End: 2024-08-05

## 2024-08-05 NOTE — TELEPHONE ENCOUNTER
Jessica from [facility] in [facility] called requesting EMG results.      Please fax to:    [phone]      Call when its been faxed or with any questions [phone].      Thank you.

## 2024-08-07 ENCOUNTER — OFFICE VISIT (OUTPATIENT)
Dept: PHYSICAL THERAPY | Facility: CLINIC | Age: 44
End: 2024-08-07
Payer: OTHER MISCELLANEOUS

## 2024-08-07 DIAGNOSIS — M25.512 CHRONIC LEFT SHOULDER PAIN: ICD-10-CM

## 2024-08-07 DIAGNOSIS — M25.511 ACUTE PAIN OF RIGHT SHOULDER: Primary | ICD-10-CM

## 2024-08-07 DIAGNOSIS — G89.29 CHRONIC LEFT SHOULDER PAIN: ICD-10-CM

## 2024-08-07 PROCEDURE — 97140 MANUAL THERAPY 1/> REGIONS: CPT | Performed by: PHYSICAL THERAPIST

## 2024-08-07 NOTE — PROGRESS NOTES
"Daily Note     Today's date: 2024  Patient name: Yoselyn Xie  : 1980  MRN: [id number]  Referring provider: Hima Grove DO  Dx:   Encounter Diagnosis     ICD-10-CM    1. Acute pain of right shoulder  M25.511       2. Chronic left shoulder pain  M25.512     G89.29                      Subjective: Giuliana explains that she has been going through a lot with her friend being ill, she has not done her exercises much this week. Otherwise she feels about the same, still has a lot of tingling in bilateral hands. Notes that her EMG is completed and gets a follow up later this month.       Objective: See treatment diary below      Assessment: Tolerated treatment well. Patient demonstrated fatigue post treatment and exhibited good technique with therapeutic exercises.       Plan: Continue per plan of care.      Precautions: None       Manuals 5/29 6/6 6/10 6/26 7/24 8/7    5/2 5/22   PROM Right shoulder          RN KSG   Shoulder mobs, inf/post Grade 2 - 3 right shoulder                IASTM/STM right forearm extensor muscles              TPR/STM R rhomboids/UT RN B RN B RN B RN B KS RN   RN     SOR RN RN RN  RN KS RN    RN    STM Pec domenico/min RN B RN B RN B RN B KS RN    RN    C/S sidebends RN RN RN RN      RN     Neuro Re-Ed                Ball roll on wall                T band scapular slides up wall                T band wall clock                Supine cervical rotation             x10 ea   Scapular retraction            10x5\"   Chin tucks            10x 5'' supine   Shoulder wall slides flexion             x10 bilateral   Ther Ex                Pulleys flex/scap             flex 2'   Supine cane shldr flex             x10   Table slides flex/scap/ER             bilateral shldr flexion x10   Cane shoulder ext          x10                                          Wall slides flex/scaption                Serratus punch supine                T band rows/ext                T band ER/IR              " "  Doorway stretch                UT s'                Self ball massage w/ towel to rhomobids/UT                Seated rhomboid s'               Ball roll outs           5\" x10   Seated thoracic ext s'            5\" x10   Doorway pec s'           10\" x10   Doorway rhomboid s'              Ther Activity               UBE                               Gait Training                                                  Modalities                CP                MH     10' pre supine 10' pre supine    8' pre supine 8'   Supine pre                           "

## 2024-08-08 ENCOUNTER — APPOINTMENT (OUTPATIENT)
Dept: PHYSICAL THERAPY | Facility: CLINIC | Age: 44
End: 2024-08-08
Payer: OTHER MISCELLANEOUS

## 2024-08-20 ENCOUNTER — APPOINTMENT (OUTPATIENT)
Dept: URGENT CARE | Facility: CLINIC | Age: 44
End: 2024-08-20
Payer: OTHER MISCELLANEOUS

## 2024-08-20 PROCEDURE — 99213 OFFICE O/P EST LOW 20 MIN: CPT | Performed by: FAMILY MEDICINE

## 2024-08-22 ENCOUNTER — APPOINTMENT (OUTPATIENT)
Dept: PHYSICAL THERAPY | Facility: CLINIC | Age: 44
End: 2024-08-22
Payer: OTHER MISCELLANEOUS

## 2024-08-29 ENCOUNTER — APPOINTMENT (OUTPATIENT)
Dept: PHYSICAL THERAPY | Facility: CLINIC | Age: 44
End: 2024-08-29
Payer: OTHER MISCELLANEOUS